# Patient Record
Sex: FEMALE | Race: WHITE | NOT HISPANIC OR LATINO | Employment: FULL TIME | URBAN - METROPOLITAN AREA
[De-identification: names, ages, dates, MRNs, and addresses within clinical notes are randomized per-mention and may not be internally consistent; named-entity substitution may affect disease eponyms.]

---

## 2018-12-11 ENCOUNTER — HOSPITAL ENCOUNTER (OUTPATIENT)
Dept: LAB | Facility: MEDICAL CENTER | Age: 63
End: 2018-12-11
Attending: FAMILY MEDICINE
Payer: COMMERCIAL

## 2018-12-11 LAB
25(OH)D3 SERPL-MCNC: 32 NG/ML (ref 30–100)
ALBUMIN SERPL BCP-MCNC: 4.5 G/DL (ref 3.2–4.9)
ALBUMIN/GLOB SERPL: 1.3 G/DL
ALP SERPL-CCNC: 70 U/L (ref 30–99)
ALT SERPL-CCNC: 20 U/L (ref 2–50)
ANION GAP SERPL CALC-SCNC: 7 MMOL/L (ref 0–11.9)
AST SERPL-CCNC: 24 U/L (ref 12–45)
BASOPHILS # BLD AUTO: 1.2 % (ref 0–1.8)
BASOPHILS # BLD: 0.06 K/UL (ref 0–0.12)
BILIRUB SERPL-MCNC: 0.6 MG/DL (ref 0.1–1.5)
BUN SERPL-MCNC: 16 MG/DL (ref 8–22)
CALCIUM SERPL-MCNC: 10.1 MG/DL (ref 8.5–10.5)
CHLORIDE SERPL-SCNC: 102 MMOL/L (ref 96–112)
CHOLEST SERPL-MCNC: 278 MG/DL (ref 100–199)
CO2 SERPL-SCNC: 28 MMOL/L (ref 20–33)
CREAT SERPL-MCNC: 0.97 MG/DL (ref 0.5–1.4)
EOSINOPHIL # BLD AUTO: 0.3 K/UL (ref 0–0.51)
EOSINOPHIL NFR BLD: 6 % (ref 0–6.9)
ERYTHROCYTE [DISTWIDTH] IN BLOOD BY AUTOMATED COUNT: 47 FL (ref 35.9–50)
GLOBULIN SER CALC-MCNC: 3.5 G/DL (ref 1.9–3.5)
GLUCOSE SERPL-MCNC: 103 MG/DL (ref 65–99)
HCT VFR BLD AUTO: 44.7 % (ref 37–47)
HDLC SERPL-MCNC: 94 MG/DL
HGB BLD-MCNC: 15.2 G/DL (ref 12–16)
IMM GRANULOCYTES # BLD AUTO: 0.01 K/UL (ref 0–0.11)
IMM GRANULOCYTES NFR BLD AUTO: 0.2 % (ref 0–0.9)
LDLC SERPL CALC-MCNC: 169 MG/DL
LYMPHOCYTES # BLD AUTO: 1.4 K/UL (ref 1–4.8)
LYMPHOCYTES NFR BLD: 27.8 % (ref 22–41)
MCH RBC QN AUTO: 31.9 PG (ref 27–33)
MCHC RBC AUTO-ENTMCNC: 34 G/DL (ref 33.6–35)
MCV RBC AUTO: 93.7 FL (ref 81.4–97.8)
MONOCYTES # BLD AUTO: 0.54 K/UL (ref 0–0.85)
MONOCYTES NFR BLD AUTO: 10.7 % (ref 0–13.4)
NEUTROPHILS # BLD AUTO: 2.72 K/UL (ref 2–7.15)
NEUTROPHILS NFR BLD: 54.1 % (ref 44–72)
NRBC # BLD AUTO: 0 K/UL
NRBC BLD-RTO: 0 /100 WBC
PLATELET # BLD AUTO: 353 K/UL (ref 164–446)
PMV BLD AUTO: 10.4 FL (ref 9–12.9)
POTASSIUM SERPL-SCNC: 4.3 MMOL/L (ref 3.6–5.5)
PROT SERPL-MCNC: 8 G/DL (ref 6–8.2)
RBC # BLD AUTO: 4.77 M/UL (ref 4.2–5.4)
SODIUM SERPL-SCNC: 137 MMOL/L (ref 135–145)
TRIGL SERPL-MCNC: 73 MG/DL (ref 0–149)
TSH SERPL DL<=0.005 MIU/L-ACNC: 2.88 UIU/ML (ref 0.38–5.33)
WBC # BLD AUTO: 5 K/UL (ref 4.8–10.8)

## 2018-12-11 PROCEDURE — 82306 VITAMIN D 25 HYDROXY: CPT

## 2018-12-11 PROCEDURE — 36415 COLL VENOUS BLD VENIPUNCTURE: CPT

## 2018-12-11 PROCEDURE — 85025 COMPLETE CBC W/AUTO DIFF WBC: CPT

## 2018-12-11 PROCEDURE — 84443 ASSAY THYROID STIM HORMONE: CPT

## 2018-12-11 PROCEDURE — 80053 COMPREHEN METABOLIC PANEL: CPT

## 2018-12-11 PROCEDURE — 80061 LIPID PANEL: CPT

## 2019-10-03 ENCOUNTER — NON-PROVIDER VISIT (OUTPATIENT)
Dept: INTERNAL MEDICINE | Facility: IMAGING CENTER | Age: 64
End: 2019-10-03
Payer: COMMERCIAL

## 2019-10-03 ENCOUNTER — APPOINTMENT (OUTPATIENT)
Dept: INTERNAL MEDICINE | Facility: IMAGING CENTER | Age: 64
End: 2019-10-03
Payer: COMMERCIAL

## 2019-10-03 DIAGNOSIS — Z23 NEED FOR VACCINATION: ICD-10-CM

## 2019-10-03 PROCEDURE — 90686 IIV4 VACC NO PRSV 0.5 ML IM: CPT | Performed by: FAMILY MEDICINE

## 2019-10-03 PROCEDURE — 90471 IMMUNIZATION ADMIN: CPT | Performed by: FAMILY MEDICINE

## 2019-10-24 ENCOUNTER — OFFICE VISIT (OUTPATIENT)
Dept: INTERNAL MEDICINE | Facility: IMAGING CENTER | Age: 64
End: 2019-10-24
Payer: COMMERCIAL

## 2019-10-24 VITALS
HEIGHT: 64 IN | SYSTOLIC BLOOD PRESSURE: 111 MMHG | TEMPERATURE: 98 F | BODY MASS INDEX: 28.85 KG/M2 | WEIGHT: 169 LBS | OXYGEN SATURATION: 100 % | HEART RATE: 78 BPM | DIASTOLIC BLOOD PRESSURE: 71 MMHG | RESPIRATION RATE: 16 BRPM

## 2019-10-24 DIAGNOSIS — M85.9 DISORDER OF BONE DENSITY AND STRUCTURE, UNSPECIFIED: ICD-10-CM

## 2019-10-24 DIAGNOSIS — Z12.31 ENCOUNTER FOR SCREENING MAMMOGRAM FOR BREAST CANCER: ICD-10-CM

## 2019-10-24 DIAGNOSIS — Z00.00 HEALTH CARE MAINTENANCE: ICD-10-CM

## 2019-10-24 DIAGNOSIS — Z71.85 VACCINE COUNSELING: ICD-10-CM

## 2019-10-24 DIAGNOSIS — E28.39 ESTROGEN DEFICIENCY: ICD-10-CM

## 2019-10-24 DIAGNOSIS — F41.1 GAD (GENERALIZED ANXIETY DISORDER): ICD-10-CM

## 2019-10-24 DIAGNOSIS — Z23 NEED FOR VACCINATION: ICD-10-CM

## 2019-10-24 DIAGNOSIS — E78.2 MIXED DYSLIPIDEMIA: ICD-10-CM

## 2019-10-24 DIAGNOSIS — R73.9 HYPERGLYCEMIA: ICD-10-CM

## 2019-10-24 DIAGNOSIS — K21.9 GASTROESOPHAGEAL REFLUX DISEASE WITHOUT ESOPHAGITIS: ICD-10-CM

## 2019-10-24 PROBLEM — Z83.3 FAMILY HISTORY OF DIABETES MELLITUS (DM): Status: ACTIVE | Noted: 2019-10-24

## 2019-10-24 PROCEDURE — 90471 IMMUNIZATION ADMIN: CPT | Performed by: FAMILY MEDICINE

## 2019-10-24 PROCEDURE — 90715 TDAP VACCINE 7 YRS/> IM: CPT | Performed by: FAMILY MEDICINE

## 2019-10-24 PROCEDURE — 99204 OFFICE O/P NEW MOD 45 MIN: CPT | Mod: 25 | Performed by: FAMILY MEDICINE

## 2019-10-24 RX ORDER — FAMOTIDINE 20 MG/1
20 TABLET, FILM COATED ORAL 2 TIMES DAILY
Qty: 180 TAB | Refills: 3 | Status: SHIPPED | OUTPATIENT
Start: 2019-10-24

## 2019-10-24 RX ORDER — PAROXETINE 10 MG/1
10 TABLET, FILM COATED ORAL DAILY
Qty: 90 TAB | Refills: 3 | Status: SHIPPED | OUTPATIENT
Start: 2019-10-24 | End: 2020-10-26

## 2019-10-24 NOTE — PROGRESS NOTES
"Chief Complaint   Patient presents with   • Establish Care   • Immunizations     Tdap       HPI:  Patient is a 64 y.o. female patient who presents today to establish care at my office. She was previously seeing Dr. Giselle Cho for her primary care needs and has seen Dr. Wynne in the past for her gynecological care. She has a history of mixed dyslipidemia without statin use, chronic ISADORA with ongoing daily Paxil use, and chronic GERD marginally controlled with daily Omeprazole use. She endorses that certain foods and wine can cause increased acid symptoms, and she is not currently sleeping on elevated surface. She had a normal screening colonoscopy three years ago with Dr. Bueno and is due for annual mammogram. She is also due for Tdap and is interested in Shingrix vaccines. She has not had a dexa scan and is now post menopausal. She denies other new health concerns, endorses 100% medication compliance, leads an active lifestyle, and is in good spirits today.     Patient Active Problem List    Diagnosis Date Noted   • Gastroesophageal reflux disease without esophagitis 10/24/2019   • ISADORA (generalized anxiety disorder) 10/24/2019   • Mixed dyslipidemia 10/24/2019   • Hyperglycemia 10/24/2019   • Family history of diabetes mellitus (DM) 10/24/2019       Past medical, surgical, family, and social history was reviewed and updated in Epic chart by me today.     Medications and allergies reviewed and updated in Epic chart by me today.     ROS:  Pertinent positives listed above in HPI. All other systems have been reviewed and are negative.    PE:   /71 (BP Location: Left arm, Patient Position: Sitting, BP Cuff Size: Adult)   Pulse 78   Temp 36.7 °C (98 °F) (Temporal)   Resp 16   Ht 1.626 m (5' 4\")   Wt 76.7 kg (169 lb)   LMP 12/19/2012   SpO2 100%   BMI 29.01 kg/m²   Vital signs reviewed with patient.     Gen: Well developed; well nourished; no acute distress; age appropriate appearance   HEENT: " Normocephalic; atraumatic; PEERLA b/l; sclera clear b/l; b/l external auditory canals WNL; b/l TM WNL; nares patent; oropharynx clear; oral mucosa moist; tongue midline; dentition adequate   Neck: No adenopathy; no thyromegaly  CV: Regular rate and rhythm; S1/ S2 present; no murmur, gallop or rub noted  Pulm: No respiratory distress; clear to ascultation b/l; no wheezing or stridor noted b/l  Abd: Adequate bowel sounds noted; soft and nontender; no rebound, rigidity, nor distention  Extremities: No peripheral edema b/l LE extremities/ no clubbing nor cyanosis noted  Skin: Warm and dry; no rashes noted   Neuro: No focal deficits noted   Psych: AAOx4; mood and affect are appropriate    A/P:  1. Need for vaccination  Vaccine administered at visit today.  - Tdap =>8yo IM    2. Gastroesophageal reflux disease without esophagitis  Ongoing issue for patient not controlled with daily Omeprazole use. Recommend patient sleep on elevated surface, avoid trigger foods and wine, especially before bed, and switch to Pepcid BID for acid control. New RX sent to pharmacy and will follow.   - famotidine (PEPCID) 20 MG Tab; Take 1 Tab by mouth 2 times a day.  Dispense: 180 Tab; Refill: 3    3. ISADORA (generalized anxiety disorder)  Stable/ condition well controlled with daily Paxil use. No safety concerns present. New RX sent to pharmacy.   - PARoxetine (PAXIL) 10 MG Tab; Take 1 Tab by mouth every day. Indications: aurobindo brand  Dispense: 90 Tab; Refill: 3    4. Encounter for screening mammogram for breast cancer  Pt is due for annual mammogram and provided patient with number to call to schedule imaging appointment.   - MA-SCREENING MAMMO BILAT W/TOMOSYNTHESIS W/CAD; Future    5. Estrogen deficiency  Pt is now post menopausal and is due for dexa scan. Pt provided with number to call to schedule imaging appointment.   - DS-BONE DENSITY STUDY (DEXA); Future    6. Disorder of bone density and structure, unspecified  - DS-BONE DENSITY  STUDY (DEXA); Future    7. Mixed dyslipidemia  Historically uncontrolled without patient being aware of this condition. She reports making lifestyle and diet changes in the past year and is due for annual fasting lipid panel.   - Lipid Profile; Future    8. Hyperglycemia  Glucose was 103 on prior lab testing and patient is due for HgA1c level.   - HEMOGLOBIN A1C; Future    9. Vaccine counseling  Pt is interested in Shingrix vaccines and will be placed on our clinic waiting list.     10. Health care maintenance  Pt is due for annual fasting labs before the end of the year. Jeanine BEST will obtain colonoscopy report from [facility] for our records, and patient encouraged to update her GYN exam/pap with Dr. Wynne in the near future.   - CBC WITH DIFFERENTIAL; Future  - Comp Metabolic Panel; Future  - TSH WITH REFLEX TO FT4; Future  - VITAMIN D,25 HYDROXY; Future    I will be in touch with patient regarding lab results, when they become available, for further care management.

## 2019-10-31 ENCOUNTER — HOSPITAL ENCOUNTER (OUTPATIENT)
Facility: MEDICAL CENTER | Age: 64
End: 2019-10-31
Attending: FAMILY MEDICINE
Payer: COMMERCIAL

## 2019-10-31 ENCOUNTER — NON-PROVIDER VISIT (OUTPATIENT)
Dept: INTERNAL MEDICINE | Facility: IMAGING CENTER | Age: 64
End: 2019-10-31
Payer: COMMERCIAL

## 2019-10-31 DIAGNOSIS — R73.9 HYPERGLYCEMIA: ICD-10-CM

## 2019-10-31 DIAGNOSIS — E78.2 MIXED DYSLIPIDEMIA: ICD-10-CM

## 2019-10-31 DIAGNOSIS — Z00.00 HEALTH CARE MAINTENANCE: ICD-10-CM

## 2019-10-31 LAB
25(OH)D3 SERPL-MCNC: 35 NG/ML (ref 30–100)
ALBUMIN SERPL BCP-MCNC: 4.5 G/DL (ref 3.2–4.9)
ALBUMIN/GLOB SERPL: 1.4 G/DL
ALP SERPL-CCNC: 79 U/L (ref 30–99)
ALT SERPL-CCNC: 28 U/L (ref 2–50)
ANION GAP SERPL CALC-SCNC: 11 MMOL/L (ref 0–11.9)
AST SERPL-CCNC: 22 U/L (ref 12–45)
BASOPHILS # BLD AUTO: 1.6 % (ref 0–1.8)
BASOPHILS # BLD: 0.08 K/UL (ref 0–0.12)
BILIRUB SERPL-MCNC: 0.5 MG/DL (ref 0.1–1.5)
BUN SERPL-MCNC: 16 MG/DL (ref 8–22)
CALCIUM SERPL-MCNC: 9.5 MG/DL (ref 8.5–10.5)
CHLORIDE SERPL-SCNC: 104 MMOL/L (ref 96–112)
CHOLEST SERPL-MCNC: 247 MG/DL (ref 100–199)
CO2 SERPL-SCNC: 27 MMOL/L (ref 20–33)
CREAT SERPL-MCNC: 1.16 MG/DL (ref 0.5–1.4)
EOSINOPHIL # BLD AUTO: 0.23 K/UL (ref 0–0.51)
EOSINOPHIL NFR BLD: 4.6 % (ref 0–6.9)
ERYTHROCYTE [DISTWIDTH] IN BLOOD BY AUTOMATED COUNT: 52.1 FL (ref 35.9–50)
EST. AVERAGE GLUCOSE BLD GHB EST-MCNC: 108 MG/DL
GLOBULIN SER CALC-MCNC: 3.3 G/DL (ref 1.9–3.5)
GLUCOSE SERPL-MCNC: 95 MG/DL (ref 65–99)
HBA1C MFR BLD: 5.4 % (ref 0–5.6)
HCT VFR BLD AUTO: 48.4 % (ref 37–47)
HDLC SERPL-MCNC: 71 MG/DL
HGB BLD-MCNC: 15.4 G/DL (ref 12–16)
IMM GRANULOCYTES # BLD AUTO: 0 K/UL (ref 0–0.11)
IMM GRANULOCYTES NFR BLD AUTO: 0 % (ref 0–0.9)
LDLC SERPL CALC-MCNC: 158 MG/DL
LYMPHOCYTES # BLD AUTO: 1.58 K/UL (ref 1–4.8)
LYMPHOCYTES NFR BLD: 31.8 % (ref 22–41)
MCH RBC QN AUTO: 31.7 PG (ref 27–33)
MCHC RBC AUTO-ENTMCNC: 31.8 G/DL (ref 33.6–35)
MCV RBC AUTO: 99.6 FL (ref 81.4–97.8)
MONOCYTES # BLD AUTO: 0.56 K/UL (ref 0–0.85)
MONOCYTES NFR BLD AUTO: 11.3 % (ref 0–13.4)
NEUTROPHILS # BLD AUTO: 2.52 K/UL (ref 2–7.15)
NEUTROPHILS NFR BLD: 50.7 % (ref 44–72)
NRBC # BLD AUTO: 0 K/UL
NRBC BLD-RTO: 0 /100 WBC
PLATELET # BLD AUTO: 360 K/UL (ref 164–446)
PMV BLD AUTO: 10.2 FL (ref 9–12.9)
POTASSIUM SERPL-SCNC: 4.4 MMOL/L (ref 3.6–5.5)
PROT SERPL-MCNC: 7.8 G/DL (ref 6–8.2)
RBC # BLD AUTO: 4.86 M/UL (ref 4.2–5.4)
SODIUM SERPL-SCNC: 142 MMOL/L (ref 135–145)
TRIGL SERPL-MCNC: 91 MG/DL (ref 0–149)
TSH SERPL DL<=0.005 MIU/L-ACNC: 1.81 UIU/ML (ref 0.38–5.33)
WBC # BLD AUTO: 5 K/UL (ref 4.8–10.8)

## 2019-10-31 PROCEDURE — 84443 ASSAY THYROID STIM HORMONE: CPT

## 2019-10-31 PROCEDURE — 82306 VITAMIN D 25 HYDROXY: CPT

## 2019-10-31 PROCEDURE — 80053 COMPREHEN METABOLIC PANEL: CPT

## 2019-10-31 PROCEDURE — 85025 COMPLETE CBC W/AUTO DIFF WBC: CPT

## 2019-10-31 PROCEDURE — 83036 HEMOGLOBIN GLYCOSYLATED A1C: CPT

## 2019-10-31 PROCEDURE — 80061 LIPID PANEL: CPT

## 2019-11-22 ENCOUNTER — HOSPITAL ENCOUNTER (OUTPATIENT)
Dept: RADIOLOGY | Facility: MEDICAL CENTER | Age: 64
End: 2019-11-22

## 2019-11-25 ENCOUNTER — APPOINTMENT (OUTPATIENT)
Dept: RADIOLOGY | Facility: MEDICAL CENTER | Age: 64
End: 2019-11-25
Attending: FAMILY MEDICINE
Payer: COMMERCIAL

## 2019-12-27 ENCOUNTER — HOSPITAL ENCOUNTER (OUTPATIENT)
Dept: RADIOLOGY | Facility: MEDICAL CENTER | Age: 64
End: 2019-12-27
Attending: FAMILY MEDICINE
Payer: COMMERCIAL

## 2019-12-27 DIAGNOSIS — E28.39 ESTROGEN DEFICIENCY: ICD-10-CM

## 2019-12-27 DIAGNOSIS — Z12.31 ENCOUNTER FOR SCREENING MAMMOGRAM FOR BREAST CANCER: ICD-10-CM

## 2019-12-27 DIAGNOSIS — M85.9 DISORDER OF BONE DENSITY AND STRUCTURE, UNSPECIFIED: ICD-10-CM

## 2019-12-27 PROCEDURE — 77063 BREAST TOMOSYNTHESIS BI: CPT

## 2019-12-27 PROCEDURE — 77080 DXA BONE DENSITY AXIAL: CPT

## 2020-05-20 DIAGNOSIS — J01.80 ACUTE NON-RECURRENT SINUSITIS OF OTHER SINUS: ICD-10-CM

## 2020-05-20 RX ORDER — AMOXICILLIN AND CLAVULANATE POTASSIUM 875; 125 MG/1; MG/1
1 TABLET, FILM COATED ORAL 2 TIMES DAILY
Qty: 14 TAB | Refills: 0 | Status: SHIPPED | OUTPATIENT
Start: 2020-05-20 | End: 2020-05-27

## 2020-06-25 ENCOUNTER — OFFICE VISIT (OUTPATIENT)
Dept: INTERNAL MEDICINE | Facility: IMAGING CENTER | Age: 65
End: 2020-06-25
Payer: COMMERCIAL

## 2020-06-25 VITALS
TEMPERATURE: 98.3 F | SYSTOLIC BLOOD PRESSURE: 127 MMHG | HEART RATE: 72 BPM | HEIGHT: 64 IN | OXYGEN SATURATION: 94 % | RESPIRATION RATE: 17 BRPM | BODY MASS INDEX: 28.87 KG/M2 | WEIGHT: 169.09 LBS | DIASTOLIC BLOOD PRESSURE: 78 MMHG

## 2020-06-25 DIAGNOSIS — E78.2 MIXED DYSLIPIDEMIA: ICD-10-CM

## 2020-06-25 DIAGNOSIS — J30.2 SEASONAL ALLERGIES: ICD-10-CM

## 2020-06-25 PROCEDURE — 96372 THER/PROPH/DIAG INJ SC/IM: CPT | Performed by: FAMILY MEDICINE

## 2020-06-25 PROCEDURE — 99214 OFFICE O/P EST MOD 30 MIN: CPT | Mod: 25 | Performed by: FAMILY MEDICINE

## 2020-06-25 RX ORDER — AZELASTINE 1 MG/ML
1 SPRAY, METERED NASAL 2 TIMES DAILY
Qty: 30 ML | Refills: 6 | Status: SHIPPED | OUTPATIENT
Start: 2020-06-25 | End: 2021-11-16 | Stop reason: SDUPTHER

## 2020-06-25 RX ORDER — TRIAMCINOLONE ACETONIDE 40 MG/ML
40 INJECTION, SUSPENSION INTRA-ARTICULAR; INTRAMUSCULAR ONCE
Status: COMPLETED | OUTPATIENT
Start: 2020-06-25 | End: 2020-06-25

## 2020-06-25 RX ADMIN — TRIAMCINOLONE ACETONIDE 40 MG: 40 INJECTION, SUSPENSION INTRA-ARTICULAR; INTRAMUSCULAR at 13:15

## 2020-06-25 ASSESSMENT — PATIENT HEALTH QUESTIONNAIRE - PHQ9: CLINICAL INTERPRETATION OF PHQ2 SCORE: 0

## 2020-06-25 ASSESSMENT — FIBROSIS 4 INDEX: FIB4 SCORE: 0.74

## 2020-06-25 NOTE — PROGRESS NOTES
"Chief Complaint   Patient presents with   • Seasonal Allergies     Has been getting worse and worse. Sneezing, increased nasal drainage, and itching. Takes allegra every night and flonase.        HPI:  Patient is a 65 y.o. female established patient who presents today to discuss worsening seasonal allergy symptoms over the past few months. She reports continual nasal drip (clear secretions), sneezing, and general throat itching, without associated fever, sinus congestion, post nasal drip, nor cough. She has been taking Allegra and Flonase daily without relief and is seeking new options. She also has history of chronic mixed dyslipidemia without statin use and is due for repeat fasting lipid panel. She is in good spirits today and is aware of GYN exam being due.     Patient Active Problem List    Diagnosis Date Noted   • Seasonal allergies 06/25/2020   • Gastroesophageal reflux disease without esophagitis 10/24/2019   • ISADORA (generalized anxiety disorder) 10/24/2019   • Mixed dyslipidemia 10/24/2019   • Hyperglycemia 10/24/2019   • Family history of diabetes mellitus (DM) 10/24/2019       Past medical, surgical, family, and social history was reviewed and updated in Epic chart by me today.     Medications and allergies reviewed and updated in Epic chart by me today.     ROS:  Pertinent positives listed above in HPI. All other systems have been reviewed and are negative.    PE:   /78 (BP Location: Left arm, Patient Position: Sitting, BP Cuff Size: Adult)   Pulse 72   Temp 36.8 °C (98.3 °F) (Temporal)   Resp 17   Ht 1.626 m (5' 4\")   Wt 76.7 kg (169 lb 1.5 oz)   LMP 12/19/2012   SpO2 94%   BMI 29.02 kg/m²   Vital signs reviewed with patient.     Gen: Well developed; well nourished; no acute distress; non toxic appearance   HEENT: Normocephalic; atraumatic; PEERLA b/l; sclera clear b/l; b/l external auditory canals WNL; b/l TM WNL; nares patent but mucosa is erythematous b/l; oropharynx clear; oral mucosa " moist; tongue midline; dentition adequate   Neck: No adenopathy; no thyromegaly  CV: Regular rate and rhythm; S1/ S2 present; no murmur, gallop or rub noted  Pulm: No respiratory distress; clear to ascultation b/l; no wheezing or stridor noted b/l  Abd: Adequate bowel sounds noted; soft and nontender; no rebound, rigidity, nor distention  Extremities: No peripheral edema b/l LE extremities/ no clubbing nor cyanosis noted  Skin: Warm and dry; no rashes noted   Neuro: No focal deficits noted   Psych: AAOx4; mood and affect are appropriate    A/P:  1. Seasonal allergies  Uncontrolled with current daily Allegra and Flonase use. Recommend patient switch from Allegra to Xyzal use daily, switch from Flonase to Astelin use daily, and obtain Kenalog injection today. Risks versus benefits of Kenalog use discussed with patient prior to injection today, and patient will contact me in the next few weeks if condition does not improve with these changes. Will refer patient to Allergists if this occurs. New RX sent to pharmacy.   - triamcinolone acetonide (KENALOG-40) injection 40 mg  - azelastine (ASTELIN) 137 MCG/SPRAY nasal spray; Spray 1 Spray in nose 2 times a day.  Dispense: 30 mL; Refill: 6    2. Mixed dyslipidemia  Uncontrolled historically without statin use. Patient is due for repeat lipid panel and will obtain this lab draw soon.   - Lipid Profile; Future

## 2020-10-25 DIAGNOSIS — F41.1 GAD (GENERALIZED ANXIETY DISORDER): ICD-10-CM

## 2020-10-26 RX ORDER — PAROXETINE 10 MG/1
TABLET, FILM COATED ORAL
Qty: 90 TAB | Refills: 3 | Status: SHIPPED | OUTPATIENT
Start: 2020-10-26 | End: 2021-07-08

## 2020-12-01 ENCOUNTER — NON-PROVIDER VISIT (OUTPATIENT)
Dept: INTERNAL MEDICINE | Facility: IMAGING CENTER | Age: 65
End: 2020-12-01
Payer: COMMERCIAL

## 2020-12-01 DIAGNOSIS — Z23 NEED FOR VACCINATION: ICD-10-CM

## 2020-12-01 PROCEDURE — 90686 IIV4 VACC NO PRSV 0.5 ML IM: CPT | Performed by: FAMILY MEDICINE

## 2020-12-01 PROCEDURE — 90471 IMMUNIZATION ADMIN: CPT | Performed by: FAMILY MEDICINE

## 2020-12-29 ENCOUNTER — APPOINTMENT (OUTPATIENT)
Dept: INTERNAL MEDICINE | Facility: IMAGING CENTER | Age: 65
End: 2020-12-29
Payer: COMMERCIAL

## 2020-12-31 ENCOUNTER — NON-PROVIDER VISIT (OUTPATIENT)
Dept: INTERNAL MEDICINE | Facility: IMAGING CENTER | Age: 65
End: 2020-12-31
Payer: COMMERCIAL

## 2020-12-31 DIAGNOSIS — Z23 NEED FOR VACCINATION: ICD-10-CM

## 2020-12-31 PROCEDURE — 90471 IMMUNIZATION ADMIN: CPT | Performed by: FAMILY MEDICINE

## 2020-12-31 PROCEDURE — 90750 HZV VACC RECOMBINANT IM: CPT | Performed by: FAMILY MEDICINE

## 2021-03-03 DIAGNOSIS — Z23 NEED FOR VACCINATION: ICD-10-CM

## 2021-04-20 DIAGNOSIS — E55.9 VITAMIN D DEFICIENCY: ICD-10-CM

## 2021-04-20 DIAGNOSIS — Z00.00 HEALTH CARE MAINTENANCE: ICD-10-CM

## 2021-04-20 DIAGNOSIS — R73.9 HYPERGLYCEMIA: ICD-10-CM

## 2021-04-20 DIAGNOSIS — R53.83 OTHER FATIGUE: ICD-10-CM

## 2021-04-21 ENCOUNTER — NON-PROVIDER VISIT (OUTPATIENT)
Dept: INTERNAL MEDICINE | Facility: IMAGING CENTER | Age: 66
End: 2021-04-21
Payer: COMMERCIAL

## 2021-04-21 ENCOUNTER — HOSPITAL ENCOUNTER (OUTPATIENT)
Facility: MEDICAL CENTER | Age: 66
End: 2021-04-21
Attending: FAMILY MEDICINE
Payer: COMMERCIAL

## 2021-04-21 DIAGNOSIS — E78.2 MIXED DYSLIPIDEMIA: ICD-10-CM

## 2021-04-21 DIAGNOSIS — R53.83 OTHER FATIGUE: ICD-10-CM

## 2021-04-21 DIAGNOSIS — R73.9 HYPERGLYCEMIA: ICD-10-CM

## 2021-04-21 DIAGNOSIS — E55.9 VITAMIN D DEFICIENCY: ICD-10-CM

## 2021-04-21 DIAGNOSIS — Z00.00 HEALTH CARE MAINTENANCE: ICD-10-CM

## 2021-04-21 LAB
ALBUMIN SERPL BCP-MCNC: 4.6 G/DL (ref 3.2–4.9)
ALBUMIN/GLOB SERPL: 1.4 G/DL
ALP SERPL-CCNC: 78 U/L (ref 30–99)
ALT SERPL-CCNC: 22 U/L (ref 2–50)
ANION GAP SERPL CALC-SCNC: 9 MMOL/L (ref 7–16)
AST SERPL-CCNC: 35 U/L (ref 12–45)
BASOPHILS # BLD AUTO: 2 % (ref 0–1.8)
BASOPHILS # BLD: 0.08 K/UL (ref 0–0.12)
BILIRUB SERPL-MCNC: 0.6 MG/DL (ref 0.1–1.5)
BUN SERPL-MCNC: 12 MG/DL (ref 8–22)
CALCIUM SERPL-MCNC: 9.5 MG/DL (ref 8.5–10.5)
CHLORIDE SERPL-SCNC: 102 MMOL/L (ref 96–112)
CHOLEST SERPL-MCNC: 310 MG/DL (ref 100–199)
CO2 SERPL-SCNC: 26 MMOL/L (ref 20–33)
CREAT SERPL-MCNC: 1.03 MG/DL (ref 0.5–1.4)
EOSINOPHIL # BLD AUTO: 0.25 K/UL (ref 0–0.51)
EOSINOPHIL NFR BLD: 6.1 % (ref 0–6.9)
ERYTHROCYTE [DISTWIDTH] IN BLOOD BY AUTOMATED COUNT: 50.7 FL (ref 35.9–50)
EST. AVERAGE GLUCOSE BLD GHB EST-MCNC: 105 MG/DL
GLOBULIN SER CALC-MCNC: 3.2 G/DL (ref 1.9–3.5)
GLUCOSE SERPL-MCNC: 92 MG/DL (ref 65–99)
HBA1C MFR BLD: 5.3 % (ref 4–5.6)
HCT VFR BLD AUTO: 45.7 % (ref 37–47)
HDLC SERPL-MCNC: 81 MG/DL
HGB BLD-MCNC: 15.1 G/DL (ref 12–16)
IMM GRANULOCYTES # BLD AUTO: 0 K/UL (ref 0–0.11)
IMM GRANULOCYTES NFR BLD AUTO: 0 % (ref 0–0.9)
LDLC SERPL CALC-MCNC: 207 MG/DL
LYMPHOCYTES # BLD AUTO: 1.44 K/UL (ref 1–4.8)
LYMPHOCYTES NFR BLD: 35.4 % (ref 22–41)
MCH RBC QN AUTO: 32.2 PG (ref 27–33)
MCHC RBC AUTO-ENTMCNC: 33 G/DL (ref 33.6–35)
MCV RBC AUTO: 97.4 FL (ref 81.4–97.8)
MONOCYTES # BLD AUTO: 0.42 K/UL (ref 0–0.85)
MONOCYTES NFR BLD AUTO: 10.3 % (ref 0–13.4)
NEUTROPHILS # BLD AUTO: 1.88 K/UL (ref 2–7.15)
NEUTROPHILS NFR BLD: 46.2 % (ref 44–72)
NRBC # BLD AUTO: 0 K/UL
NRBC BLD-RTO: 0 /100 WBC
PLATELET # BLD AUTO: 321 K/UL (ref 164–446)
PMV BLD AUTO: 10.2 FL (ref 9–12.9)
POTASSIUM SERPL-SCNC: 4 MMOL/L (ref 3.6–5.5)
PROT SERPL-MCNC: 7.8 G/DL (ref 6–8.2)
RBC # BLD AUTO: 4.69 M/UL (ref 4.2–5.4)
SODIUM SERPL-SCNC: 137 MMOL/L (ref 135–145)
TRIGL SERPL-MCNC: 112 MG/DL (ref 0–149)
TSH SERPL DL<=0.005 MIU/L-ACNC: 3.54 UIU/ML (ref 0.38–5.33)
WBC # BLD AUTO: 4.1 K/UL (ref 4.8–10.8)

## 2021-04-21 PROCEDURE — 82306 VITAMIN D 25 HYDROXY: CPT

## 2021-04-21 PROCEDURE — 83036 HEMOGLOBIN GLYCOSYLATED A1C: CPT

## 2021-04-21 PROCEDURE — 80053 COMPREHEN METABOLIC PANEL: CPT

## 2021-04-21 PROCEDURE — 80061 LIPID PANEL: CPT

## 2021-04-21 PROCEDURE — 84443 ASSAY THYROID STIM HORMONE: CPT

## 2021-04-21 PROCEDURE — 85025 COMPLETE CBC W/AUTO DIFF WBC: CPT

## 2021-04-23 LAB — 25(OH)D3 SERPL-MCNC: 31 NG/ML (ref 30–80)

## 2021-04-29 ENCOUNTER — OFFICE VISIT (OUTPATIENT)
Dept: INTERNAL MEDICINE | Facility: IMAGING CENTER | Age: 66
End: 2021-04-29
Payer: COMMERCIAL

## 2021-04-29 VITALS
HEIGHT: 64 IN | DIASTOLIC BLOOD PRESSURE: 86 MMHG | RESPIRATION RATE: 16 BRPM | WEIGHT: 162 LBS | OXYGEN SATURATION: 93 % | HEART RATE: 80 BPM | SYSTOLIC BLOOD PRESSURE: 124 MMHG | BODY MASS INDEX: 27.66 KG/M2 | TEMPERATURE: 97.8 F

## 2021-04-29 DIAGNOSIS — K21.9 GASTROESOPHAGEAL REFLUX DISEASE WITHOUT ESOPHAGITIS: ICD-10-CM

## 2021-04-29 DIAGNOSIS — R53.83 OTHER FATIGUE: ICD-10-CM

## 2021-04-29 DIAGNOSIS — J30.2 SEASONAL ALLERGIES: ICD-10-CM

## 2021-04-29 DIAGNOSIS — Z12.31 BREAST CANCER SCREENING BY MAMMOGRAM: ICD-10-CM

## 2021-04-29 DIAGNOSIS — Z00.00 WELLNESS EXAMINATION: ICD-10-CM

## 2021-04-29 DIAGNOSIS — Z23 NEED FOR VACCINATION: ICD-10-CM

## 2021-04-29 DIAGNOSIS — E78.2 MIXED DYSLIPIDEMIA: ICD-10-CM

## 2021-04-29 DIAGNOSIS — F32.1 CURRENT MODERATE EPISODE OF MAJOR DEPRESSIVE DISORDER WITHOUT PRIOR EPISODE (HCC): ICD-10-CM

## 2021-04-29 PROBLEM — R73.9 HYPERGLYCEMIA: Status: RESOLVED | Noted: 2019-10-24 | Resolved: 2021-04-29

## 2021-04-29 PROCEDURE — 90732 PPSV23 VACC 2 YRS+ SUBQ/IM: CPT | Performed by: FAMILY MEDICINE

## 2021-04-29 PROCEDURE — 90471 IMMUNIZATION ADMIN: CPT | Performed by: FAMILY MEDICINE

## 2021-04-29 PROCEDURE — 99397 PER PM REEVAL EST PAT 65+ YR: CPT | Mod: 25 | Performed by: FAMILY MEDICINE

## 2021-04-29 RX ORDER — ATORVASTATIN CALCIUM 20 MG/1
20 TABLET, FILM COATED ORAL
Qty: 90 TABLET | Refills: 1 | Status: SHIPPED | OUTPATIENT
Start: 2021-04-29 | End: 2021-11-06 | Stop reason: SDUPTHER

## 2021-04-29 RX ORDER — LEVOCETIRIZINE DIHYDROCHLORIDE 5 MG/1
1 TABLET, FILM COATED ORAL
COMMUNITY

## 2021-04-29 ASSESSMENT — PATIENT HEALTH QUESTIONNAIRE - PHQ9
SUM OF ALL RESPONSES TO PHQ QUESTIONS 1-9: 9
5. POOR APPETITE OR OVEREATING: 2 - MORE THAN HALF THE DAYS
CLINICAL INTERPRETATION OF PHQ2 SCORE: 1

## 2021-04-29 ASSESSMENT — FIBROSIS 4 INDEX: FIB4 SCORE: 1.51

## 2021-05-02 NOTE — PROGRESS NOTES
"Chief Complaint   Patient presents with   • Annual Exam       HPI:  Patient is a 66 y.o. female established patient who presents today for her annual exam and to review labs done 4/21/2021. She has history of chronic mixed dyslipidemia without current statin use, and chronic seasonal allergies managed with oral medication and nasal spray. She has chronic GERD with ongoing PPI use and history of ISADORA with ongoing low dose Paxil use. She endorses feeling significant daytime fatigue and need to sleep 10-12hrs nightly, as well as take afternoon nap most days to preserve her energy. She reports that this has been ongoing for the past several months and is grieving the death of her brother in March/ daughter going through a divorce currently. She denies history of snoring nor disrupted sleep patterns, and her  has now noticed these behaviors/brought it to her attention. She is due for Pneumovax today, has order from GYN team for screening mammogram, and endorses 100% medication compliance.     Patient Active Problem List    Diagnosis Date Noted   • Current moderate episode of major depressive disorder (HCC) 04/29/2021   • Seasonal allergies 06/25/2020   • Gastroesophageal reflux disease without esophagitis 10/24/2019   • ISADORA (generalized anxiety disorder) 10/24/2019   • Mixed dyslipidemia 10/24/2019   • Family history of diabetes mellitus (DM) 10/24/2019       Past medical, surgical, family, and social history was reviewed and updated in Epic chart by me today.     Medications and allergies reviewed and updated in Epic chart by me today.     Lab results 4/21/2021 reviewed with patient at visit today.     ROS:  Pertinent positives listed above in HPI. All other systems have been reviewed and are negative.    PE:   /86 (BP Location: Left arm, Patient Position: Sitting, BP Cuff Size: Adult)   Pulse 80   Temp 36.6 °C (97.8 °F) (Temporal)   Resp 16   Ht 1.626 m (5' 4\")   Wt 73.5 kg (162 lb)   LMP 12/19/2012  "  SpO2 93%   BMI 27.81 kg/m²   Vital signs reviewed with patient.     Gen: Well developed; well nourished; no acute distress; age appropriate appearance   HEENT: Normocephalic; atraumatic; PEERLA b/l; sclera clear b/l; b/l external auditory canals WNL; b/l TM WNL; nares patent; oropharynx clear; mask in place  Neck: No adenopathy; no thyromegaly  CV: Regular rate and rhythm; S1/ S2 present; no murmur, gallop or rub noted  Pulm: No respiratory distress; clear to ascultation b/l; no wheezing or stridor noted b/l  Abd: Adequate bowel sounds noted; soft and nontender; no rebound, rigidity, nor distention  Extremities: No peripheral edema b/l LE extremities/ no clubbing nor cyanosis noted  Skin: Warm and dry; no rashes noted   Neuro: No focal deficits noted   Psych: AAOx4; mood and affect are appropriate    A/P:  1. Need for vaccination  Vaccine administered at visit today.  - PneumoVax PPV23 =>1yo    2. Mixed dyslipidemia  Uncontrolled without current statin use. We discussed condition at visit today, and I recommend patient start nightly Lipitor, continue weight management and heathy food choices, and repeat fasting labs in 3-4 months for ongoing medical management. New RX sent to pharmacy.   - atorvastatin (LIPITOR) 20 MG Tab; Take 1 tablet by mouth at bedtime.  Dispense: 90 tablet; Refill: 1  - Lipid Profile; Future  - CREATINE KINASE; Future    3. Gastroesophageal reflux disease without esophagitis  Stable/ patient can continue current Pepcid use.     4. Seasonal allergies  Stable/ patient can continue current Xyzal and Astelin use PRN.     5. Current moderate episode of major depressive disorder without prior episode (HCC)  Uncontrolled - I feel that patient is suffering from uncontrolled depression (PHQ score is 9 today), which is causing her to sleep 10-12 hrs nightly as well as require a daily nap. She denies history of snoring nor LACHELLE and is grieving from death of brother in March/ daughter going through  divorce. She also endorses periods of diminished appetite and is currently taking Paxil 10 mg daily. Recommend patient increase Paxil dose to 20 mg, consider counseling, and follow clinical response. No overt safety concerns present.     6. Other fatigue  Refer to #5    7. Breast cancer screening by mammogram  Patient is aware of overdue screening mammogram and has order at home from GYN team.     8. Wellness examination  Patient is stable and remains well informed about chronic medical conditions that need additional attention moving forward. Vitamin D level is 31 and recommend patient increase Vitamin D intake daily.     Recommend patient f/u with me in one month for ongoing medical management.

## 2021-07-08 ENCOUNTER — OFFICE VISIT (OUTPATIENT)
Dept: INTERNAL MEDICINE | Facility: IMAGING CENTER | Age: 66
End: 2021-07-08
Payer: MEDICARE

## 2021-07-08 VITALS
HEART RATE: 78 BPM | BODY MASS INDEX: 27.66 KG/M2 | WEIGHT: 162.04 LBS | TEMPERATURE: 98.6 F | OXYGEN SATURATION: 96 % | SYSTOLIC BLOOD PRESSURE: 122 MMHG | DIASTOLIC BLOOD PRESSURE: 70 MMHG | RESPIRATION RATE: 17 BRPM | HEIGHT: 64 IN

## 2021-07-08 DIAGNOSIS — F32.1 CURRENT MODERATE EPISODE OF MAJOR DEPRESSIVE DISORDER WITHOUT PRIOR EPISODE (HCC): ICD-10-CM

## 2021-07-08 DIAGNOSIS — Z12.31 ENCOUNTER FOR SCREENING MAMMOGRAM FOR BREAST CANCER: ICD-10-CM

## 2021-07-08 DIAGNOSIS — J30.2 SEASONAL ALLERGIES: ICD-10-CM

## 2021-07-08 PROCEDURE — 99214 OFFICE O/P EST MOD 30 MIN: CPT | Performed by: FAMILY MEDICINE

## 2021-07-08 RX ORDER — PAROXETINE HYDROCHLORIDE 20 MG/1
20 TABLET, FILM COATED ORAL DAILY
Qty: 90 TABLET | Refills: 3 | Status: SHIPPED | OUTPATIENT
Start: 2021-07-08 | End: 2022-01-02

## 2021-07-08 ASSESSMENT — FIBROSIS 4 INDEX: FIB4 SCORE: 1.51

## 2021-07-09 NOTE — PROGRESS NOTES
"Chief Complaint   Patient presents with   • Follow-Up     Increased dosage of Paxil. Feels much better.    • Seasonal Allergies     Sore throat in morning - thinking tamanna wonders if she should take mucinex for post nasal drip.       HPI:  Patient is a 66 y.o. female established patient who presents today for a follow up appointment regarding chronic major depressive disorder. Since our visit together 4/29/2021, she has increased Paxil dose from 10 mg to 20 mg daily and is feeling much better overall. She feels back at her baseline, and no safety concerns present. She also has chronic seasonal allergies and has noticed morning sore throat upon wakening recently. Condition resolves as morning progresses, and she is wondering if post nasal drip is causing this condition. She denies associated acute illness symptoms at this time and is compliant with daily nasal spray and Xyzal use.     Patient Active Problem List    Diagnosis Date Noted   • Current moderate episode of major depressive disorder (HCC) 04/29/2021   • Seasonal allergies 06/25/2020   • Gastroesophageal reflux disease without esophagitis 10/24/2019   • ISADORA (generalized anxiety disorder) 10/24/2019   • Mixed dyslipidemia 10/24/2019   • Family history of diabetes mellitus (DM) 10/24/2019       Past medical, surgical, family, and social history was reviewed and updated in Epic chart by me today.     Medications and allergies reviewed and updated in Epic chart by me today.     ROS:  Pertinent positives listed above in HPI. All other systems have been reviewed and are negative.    PE:   /70 (BP Location: Left arm, Patient Position: Sitting, BP Cuff Size: Adult)   Pulse 78   Temp 37 °C (98.6 °F) (Temporal)   Resp 17   Ht 1.626 m (5' 4\")   Wt 73.5 kg (162 lb 0.6 oz)   LMP 12/19/2012   SpO2 96%   BMI 27.81 kg/m²   Vital signs reviewed with patient.     Gen: Well developed; well nourished; no acute distress; age appropriate appearance   Skin: Warm " and dry; no rashes noted   Neuro: No focal deficits noted   Psych: AAOx4; mood and affect are appropriate    A/P:  1. Current moderate episode of major depressive disorder without prior episode (HCC)  Markedly improved after Paxil dose increased from 10 to 20 mg daily. Recommend patient continue current Paxil use, and no safety concerns present. New RX sent to pharmacy.   - PARoxetine (PAXIL) 20 MG Tab; Take 1 tablet by mouth every day.  Dispense: 90 tablet; Refill: 3    2. Seasonal allergies  Patient is experiencing morning sore throat upon wakening that resolves as morning progresses. Recommend patient continue current seasonal allergy medication and nasal spray use, add nasal saline rinse PRN, and trial plain Mucinex HS to see if condition resolves.     3. Encounter for screening mammogram for breast cancer  Patient is overdue for screening mammogram, and I encouraged her to update this important screening exam in near future.   - MA-SCREENING MAMMO BILAT W/TOMOSYNTHESIS W/CAD; Future

## 2021-10-15 ENCOUNTER — PATIENT MESSAGE (OUTPATIENT)
Dept: HEALTH INFORMATION MANAGEMENT | Facility: OTHER | Age: 66
End: 2021-10-15

## 2021-10-28 ENCOUNTER — TELEPHONE (OUTPATIENT)
Dept: HEALTH INFORMATION MANAGEMENT | Facility: OTHER | Age: 66
End: 2021-10-28

## 2021-11-02 ENCOUNTER — HOSPITAL ENCOUNTER (OUTPATIENT)
Facility: MEDICAL CENTER | Age: 66
End: 2021-11-02
Attending: FAMILY MEDICINE
Payer: MEDICARE

## 2021-11-02 ENCOUNTER — NON-PROVIDER VISIT (OUTPATIENT)
Dept: INTERNAL MEDICINE | Facility: IMAGING CENTER | Age: 66
End: 2021-11-02
Payer: MEDICARE

## 2021-11-02 DIAGNOSIS — E78.2 MIXED DYSLIPIDEMIA: ICD-10-CM

## 2021-11-02 PROCEDURE — 80061 LIPID PANEL: CPT

## 2021-11-02 PROCEDURE — 82550 ASSAY OF CK (CPK): CPT

## 2021-11-03 PROBLEM — M85.80 OSTEOPENIA: Status: ACTIVE | Noted: 2021-11-03

## 2021-11-03 PROBLEM — N18.31 CHRONIC KIDNEY DISEASE, STAGE 3A: Status: ACTIVE | Noted: 2021-11-03

## 2021-11-03 LAB
CHOLEST SERPL-MCNC: 246 MG/DL (ref 100–199)
CK SERPL-CCNC: 85 U/L (ref 0–154)
HDLC SERPL-MCNC: 93 MG/DL
LDLC SERPL CALC-MCNC: 120 MG/DL
TRIGL SERPL-MCNC: 166 MG/DL (ref 0–149)

## 2021-11-06 DIAGNOSIS — E78.2 MIXED DYSLIPIDEMIA: ICD-10-CM

## 2021-11-06 RX ORDER — ATORVASTATIN CALCIUM 20 MG/1
20 TABLET, FILM COATED ORAL
Qty: 90 TABLET | Refills: 1 | Status: SHIPPED | OUTPATIENT
Start: 2021-11-06 | End: 2022-02-11

## 2021-11-16 ENCOUNTER — OFFICE VISIT (OUTPATIENT)
Dept: INTERNAL MEDICINE | Facility: IMAGING CENTER | Age: 66
End: 2021-11-16
Payer: MEDICARE

## 2021-11-16 VITALS
HEIGHT: 64 IN | SYSTOLIC BLOOD PRESSURE: 122 MMHG | HEART RATE: 95 BPM | DIASTOLIC BLOOD PRESSURE: 66 MMHG | TEMPERATURE: 98.1 F | BODY MASS INDEX: 29.32 KG/M2 | WEIGHT: 171.74 LBS | OXYGEN SATURATION: 93 % | RESPIRATION RATE: 17 BRPM

## 2021-11-16 DIAGNOSIS — J30.2 SEASONAL ALLERGIES: ICD-10-CM

## 2021-11-16 DIAGNOSIS — Z12.31 ENCOUNTER FOR SCREENING MAMMOGRAM FOR MALIGNANT NEOPLASM OF BREAST: ICD-10-CM

## 2021-11-16 DIAGNOSIS — Z23 NEED FOR VACCINATION: ICD-10-CM

## 2021-11-16 DIAGNOSIS — E78.2 MIXED DYSLIPIDEMIA: ICD-10-CM

## 2021-11-16 PROCEDURE — 99214 OFFICE O/P EST MOD 30 MIN: CPT | Mod: 25 | Performed by: FAMILY MEDICINE

## 2021-11-16 PROCEDURE — 90662 IIV NO PRSV INCREASED AG IM: CPT | Performed by: FAMILY MEDICINE

## 2021-11-16 PROCEDURE — G0008 ADMIN INFLUENZA VIRUS VAC: HCPCS | Performed by: FAMILY MEDICINE

## 2021-11-16 RX ORDER — AZELASTINE 1 MG/ML
1 SPRAY, METERED NASAL 2 TIMES DAILY
Qty: 30 ML | Refills: 6 | Status: SHIPPED | OUTPATIENT
Start: 2021-11-16 | End: 2022-07-01 | Stop reason: SDUPTHER

## 2021-11-16 ASSESSMENT — FIBROSIS 4 INDEX: FIB4 SCORE: 1.51

## 2021-11-17 NOTE — PROGRESS NOTES
"Chief Complaint   Patient presents with   • Sinus Problem     Nose is constantly running - has tried mucinex, xyzol, shelly pot. She started Claritin last Saturday and it seems to be helping more than the others.    • Immunizations     Flu shot.       HPI:  Patient is a 66 y.o. female established patient who presents today to obtain annual flu shot and to discuss ongoing nasal drip/seasonal allergy issues. She reports that her typical seasonal allergies have been prolonged and are now unmanageable. She has been using Xyzal now Claritin since last weekend, neti pot rinses, Mucinxes, and daily Astelin without symptom control. She denies associated N/V/D/F/sputum production, and she is seeking other treatment options. She has annual mammogram scheduled for 12/22/2021 and is otherwise stable at this time.     Patient Active Problem List    Diagnosis Date Noted   • Chronic kidney disease, stage 3a (HCC) 11/03/2021   • Osteopenia 11/03/2021   • BMI 30.0-30.9,adult 11/03/2021   • Current moderate episode of major depressive disorder (HCC) 04/29/2021   • Seasonal allergies 06/25/2020   • Gastroesophageal reflux disease without esophagitis 10/24/2019   • ISADORA (generalized anxiety disorder) 10/24/2019   • Mixed dyslipidemia 10/24/2019   • Family history of diabetes mellitus (DM) 10/24/2019       Past medical, surgical, family, and social history was reviewed and updated in Epic chart by me today.     Medications and allergies reviewed and updated in Epic chart by me today.     ROS:  Pertinent positives listed above in HPI. All other systems have been reviewed and are negative.    PE:   /66 (BP Location: Left arm, Patient Position: Sitting, BP Cuff Size: Adult)   Pulse 95   Temp 36.7 °C (98.1 °F) (Temporal)   Resp 17   Ht 1.626 m (5' 4\")   Wt 77.9 kg (171 lb 11.8 oz)   LMP 12/19/2012   SpO2 93%   BMI 29.48 kg/m²   Vital signs reviewed with patient.     Gen: Well developed; well nourished; no acute distress; non " toxic appearance   HEENT: Normocephalic; atraumatic; PEERLA b/l; sclera clear b/l; b/l external auditory canals WNL; b/l TM WNL; nares patent but inflammation present; oropharynx clear; oral mucosa moist; tongue midline; dentition adequate after mask removed  Neck: No adenopathy; no thyromegaly  CV: Regular rate and rhythm; S1/ S2 present; no murmur, gallop or rub noted  Pulm: No respiratory distress; clear to ascultation b/l; no wheezing or stridor noted b/l  Extremities: No peripheral edema b/l LE extremities/ no clubbing nor cyanosis noted  Skin: Warm and dry; no rashes noted   Neuro: No focal deficits noted   Psych: AAOx4; mood and affect are appropriate    A/P:  1. Need for vaccination  Vaccine administered at visit today.  - INFLUENZA VACCINE, HIGH DOSE (65+ ONLY)    2. Seasonal allergies  Uncontrolled condition for patient despite ongoing use of Mucinex, Xyzal and now Claritin, neti pot, and daily Astelin use. We discussed condition at visit and recommend patient see allergist for further specialty evaluation and treatment. Will send referral to [facility] and new RX sent to pharmacy - recommend patient use nasal spray BID moving forward.   - azelastine (ASTELIN) 137 MCG/SPRAY nasal spray; Administer 1 Spray into affected nostril(S) 2 times a day.  Dispense: 30 mL; Refill: 6  - Referral to Allergy    3. Mixed dyslipidemia  Improved since patient started Lipitor 20 mg HS. Recommend patient continue current statin use, ongoing healthy diet choices, and will follow closely.     4. Encounter for screening mammogram for malignant neoplasm of breast  Patient has screening mammogram scheduled for 12/22/2021.

## 2021-12-22 ENCOUNTER — HOSPITAL ENCOUNTER (OUTPATIENT)
Dept: RADIOLOGY | Facility: MEDICAL CENTER | Age: 66
End: 2021-12-22
Attending: FAMILY MEDICINE
Payer: MEDICARE

## 2021-12-22 DIAGNOSIS — Z12.31 ENCOUNTER FOR SCREENING MAMMOGRAM FOR BREAST CANCER: ICD-10-CM

## 2021-12-22 PROCEDURE — 77063 BREAST TOMOSYNTHESIS BI: CPT

## 2022-01-02 DIAGNOSIS — F32.1 CURRENT MODERATE EPISODE OF MAJOR DEPRESSIVE DISORDER WITHOUT PRIOR EPISODE (HCC): ICD-10-CM

## 2022-01-02 RX ORDER — PAROXETINE 10 MG/1
10 TABLET, FILM COATED ORAL DAILY
Qty: 90 TABLET | Refills: 3 | Status: SHIPPED | OUTPATIENT
Start: 2022-01-02 | End: 2023-01-06

## 2022-02-11 DIAGNOSIS — E78.2 MIXED DYSLIPIDEMIA: ICD-10-CM

## 2022-02-11 RX ORDER — ATORVASTATIN CALCIUM 20 MG/1
TABLET, FILM COATED ORAL
Qty: 100 TABLET | Refills: 0 | Status: SHIPPED | OUTPATIENT
Start: 2022-02-11 | End: 2022-05-23

## 2022-05-22 DIAGNOSIS — E78.2 MIXED DYSLIPIDEMIA: ICD-10-CM

## 2022-05-23 RX ORDER — ATORVASTATIN CALCIUM 20 MG/1
TABLET, FILM COATED ORAL
Qty: 100 TABLET | Refills: 0 | Status: SHIPPED | OUTPATIENT
Start: 2022-05-23 | End: 2022-09-19

## 2022-05-26 ENCOUNTER — TELEPHONE (OUTPATIENT)
Dept: INTERNAL MEDICINE | Facility: IMAGING CENTER | Age: 67
End: 2022-05-26
Payer: MEDICARE

## 2022-05-26 DIAGNOSIS — U07.1 COVID-19 VIRUS INFECTION: ICD-10-CM

## 2022-05-31 DIAGNOSIS — J30.2 SEASONAL ALLERGIES: ICD-10-CM

## 2022-05-31 DIAGNOSIS — J34.89 SINUS DRAINAGE: ICD-10-CM

## 2022-05-31 DIAGNOSIS — R09.81 CONGESTION OF NASAL SINUS: ICD-10-CM

## 2022-07-01 ENCOUNTER — OFFICE VISIT (OUTPATIENT)
Dept: INTERNAL MEDICINE | Facility: IMAGING CENTER | Age: 67
End: 2022-07-01
Payer: MEDICARE

## 2022-07-01 VITALS
SYSTOLIC BLOOD PRESSURE: 124 MMHG | WEIGHT: 171.74 LBS | RESPIRATION RATE: 17 BRPM | HEART RATE: 75 BPM | OXYGEN SATURATION: 95 % | DIASTOLIC BLOOD PRESSURE: 72 MMHG | BODY MASS INDEX: 29.32 KG/M2 | HEIGHT: 64 IN | TEMPERATURE: 98.5 F

## 2022-07-01 DIAGNOSIS — Z00.00 HEALTH CARE MAINTENANCE: ICD-10-CM

## 2022-07-01 DIAGNOSIS — J30.2 SEASONAL ALLERGIES: ICD-10-CM

## 2022-07-01 DIAGNOSIS — Z71.85 VACCINE COUNSELING: ICD-10-CM

## 2022-07-01 DIAGNOSIS — M54.50 CHRONIC LEFT-SIDED LOW BACK PAIN WITHOUT SCIATICA: ICD-10-CM

## 2022-07-01 DIAGNOSIS — G89.29 CHRONIC LEFT-SIDED LOW BACK PAIN WITHOUT SCIATICA: ICD-10-CM

## 2022-07-01 DIAGNOSIS — N18.31 CHRONIC KIDNEY DISEASE, STAGE 3A: ICD-10-CM

## 2022-07-01 DIAGNOSIS — E78.2 MIXED DYSLIPIDEMIA: ICD-10-CM

## 2022-07-01 PROCEDURE — 99214 OFFICE O/P EST MOD 30 MIN: CPT | Performed by: FAMILY MEDICINE

## 2022-07-01 RX ORDER — METHYLPREDNISOLONE 4 MG/1
TABLET ORAL
Qty: 21 TABLET | Refills: 0 | Status: SHIPPED | OUTPATIENT
Start: 2022-07-01 | End: 2023-01-25

## 2022-07-01 RX ORDER — AZELASTINE 1 MG/ML
1 SPRAY, METERED NASAL 2 TIMES DAILY
Qty: 30 ML | Refills: 6 | Status: SHIPPED | OUTPATIENT
Start: 2022-07-01 | End: 2023-01-25

## 2022-07-01 ASSESSMENT — FIBROSIS 4 INDEX: FIB4 SCORE: 1.53

## 2022-07-05 NOTE — PROGRESS NOTES
"Chief Complaint   Patient presents with   • Leg Pain     Ha been present x1 month. Left leg sciatic pain. Started stretching and it helps but she would like to see if there is anything else that can help.        HPI:  Patient is a 67 y.o. female established patient who presents today for evaluation of new left lower back and upper leg pain that started approximately one month ago. Her condition was triggered by doing housework (vacuuming/mopping motion), and she describes it as a dull ache without typical sciatica pain. She has been doing supportive care measures including stretching but continues to have ongoing symptoms. She denies associated bowel or bladder involvement and is ambulating well today at our visit. She has fully recovered from recent COVID infection and denies lingering related symptoms.      Patient Active Problem List    Diagnosis Date Noted   • Chronic kidney disease, stage 3a ([facility]) 11/03/2021   • Osteopenia 11/03/2021   • Current moderate episode of major depressive disorder ([facility]) 04/29/2021   • Seasonal allergies 06/25/2020   • Gastroesophageal reflux disease without esophagitis 10/24/2019   • ISADORA (generalized anxiety disorder) 10/24/2019   • Mixed dyslipidemia 10/24/2019   • Family history of diabetes mellitus (DM) 10/24/2019       Past medical, surgical, family, and social history was reviewed and updated in Epic chart by me today.     Medications and allergies reviewed and updated in Epic chart by me today.     ROS:  Pertinent positives listed above in HPI. All other systems have been reviewed and are negative.    PE:   /72 (BP Location: Left arm, Patient Position: Sitting, BP Cuff Size: Adult)   Pulse 75   Temp 36.9 °C (98.5 °F) (Temporal)   Resp 17   Ht 1.626 m (5' 4\")   Wt 77.9 kg (171 lb 11.8 oz)   LMP 12/19/2012   SpO2 95%   BMI 29.48 kg/m²   Vital signs reviewed with patient.     Gen: Well developed; well nourished; no acute distress; age appropriate appearance   Back: no " midline pain; paraspinal muscle tightness and discomfort with palpation over left lower L spine area/sciatic notch but she denies typical sciatica symptoms; no pain or limitations with flexion and extension from hip level  Extremities: No peripheral edema b/l LE extremities/ no clubbing nor cyanosis noted  Skin: Warm and dry; no rashes noted   Neuro: No focal deficits noted; ambulating well and able to sit in chair and stand without assistance  Psych: AAOx4; mood and affect are appropriate    A/P:  1. Chronic left-sided low back pain without sciatica  New condition present for the past 1 month, described as a dull ache in left lumber spine area, that was triggered by doing housework (specifically vacuuming/mopping motion). We discussed condition at visit and recommend patient start Medrol dose pack today/ referral made to [facility] for PT sessions. New RX sent to pharmacy, and no indication for imaging at this time.   - methylPREDNISolone (MEDROL DOSEPAK) 4 MG Tablet Therapy Pack; As directed on the packaging label.  Dispense: 21 Tablet; Refill: 0  - Referral to Physical Therapy    2. Seasonal allergies  Ongoing issue for patient and recommend Astelin use. New RX sent to pharmacy.   - azelastine (ASTELIN) 137 MCG/SPRAY nasal spray; Administer 1 Spray into affected nostril(S) 2 times a day.  Dispense: 30 mL; Refill: 6    3. Chronic kidney disease, stage 3a (HCC)  Patient is due for fasting labs and urine testing for ongoing management.   - MICROALBUMIN CREAT RATIO URINE; Future  - Comp Metabolic Panel; Future    4. Mixed dyslipidemia  Patient is due for repeat fasting lipid panel for ongoing management of current Lipitor use.   - Lipid Profile; Future    5. Vaccine counseling  Patient educated about vaccine eligibility at visit today.    6. Health care maintenance  Patient encouraged to update her fasting labs ASAP for ongoing medical management.  - CBC WITH DIFFERENTIAL; Future  - TSH WITH REFLEX TO FT4;  Future  - VITAMIN D,25 HYDROXY; Future  - HEMOGLOBIN A1C; Future

## 2022-09-19 DIAGNOSIS — E78.2 MIXED DYSLIPIDEMIA: ICD-10-CM

## 2022-09-19 RX ORDER — ATORVASTATIN CALCIUM 20 MG/1
TABLET, FILM COATED ORAL
Qty: 30 TABLET | Refills: 0 | Status: SHIPPED | OUTPATIENT
Start: 2022-09-19 | End: 2022-11-03 | Stop reason: SDUPTHER

## 2022-09-27 DIAGNOSIS — D22.9 ENLARGED SKIN MOLE: ICD-10-CM

## 2022-11-03 ENCOUNTER — PATIENT MESSAGE (OUTPATIENT)
Dept: INTERNAL MEDICINE | Facility: IMAGING CENTER | Age: 67
End: 2022-11-03
Payer: MEDICARE

## 2022-11-03 DIAGNOSIS — E78.2 MIXED DYSLIPIDEMIA: ICD-10-CM

## 2022-11-03 RX ORDER — ATORVASTATIN CALCIUM 20 MG/1
20 TABLET, FILM COATED ORAL
Qty: 30 TABLET | Refills: 0 | Status: SHIPPED | OUTPATIENT
Start: 2022-11-03 | End: 2022-11-29

## 2022-11-11 ENCOUNTER — PATIENT MESSAGE (OUTPATIENT)
Dept: HEALTH INFORMATION MANAGEMENT | Facility: OTHER | Age: 67
End: 2022-11-11

## 2022-11-29 DIAGNOSIS — E78.2 MIXED DYSLIPIDEMIA: ICD-10-CM

## 2022-11-29 RX ORDER — ATORVASTATIN CALCIUM 20 MG/1
TABLET, FILM COATED ORAL
Qty: 30 TABLET | Refills: 0 | Status: SHIPPED | OUTPATIENT
Start: 2022-11-29 | End: 2023-01-12 | Stop reason: SDUPTHER

## 2022-12-31 DIAGNOSIS — E78.2 MIXED DYSLIPIDEMIA: ICD-10-CM

## 2023-01-03 RX ORDER — ATORVASTATIN CALCIUM 20 MG/1
TABLET, FILM COATED ORAL
Qty: 30 TABLET | Refills: 0 | OUTPATIENT
Start: 2023-01-03

## 2023-01-11 ENCOUNTER — NON-PROVIDER VISIT (OUTPATIENT)
Dept: INTERNAL MEDICINE | Facility: IMAGING CENTER | Age: 68
End: 2023-01-11
Payer: MEDICARE

## 2023-01-11 ENCOUNTER — HOSPITAL ENCOUNTER (OUTPATIENT)
Facility: MEDICAL CENTER | Age: 68
End: 2023-01-11
Attending: FAMILY MEDICINE
Payer: MEDICARE

## 2023-01-11 DIAGNOSIS — Z00.00 HEALTH CARE MAINTENANCE: ICD-10-CM

## 2023-01-11 DIAGNOSIS — E78.2 MIXED DYSLIPIDEMIA: ICD-10-CM

## 2023-01-11 DIAGNOSIS — N18.31 CHRONIC KIDNEY DISEASE, STAGE 3A: ICD-10-CM

## 2023-01-11 DIAGNOSIS — Z23 NEED FOR VACCINATION: ICD-10-CM

## 2023-01-11 PROCEDURE — 90662 IIV NO PRSV INCREASED AG IM: CPT | Performed by: FAMILY MEDICINE

## 2023-01-11 PROCEDURE — 83036 HEMOGLOBIN GLYCOSYLATED A1C: CPT

## 2023-01-11 PROCEDURE — G0008 ADMIN INFLUENZA VIRUS VAC: HCPCS | Performed by: FAMILY MEDICINE

## 2023-01-11 PROCEDURE — 80061 LIPID PANEL: CPT

## 2023-01-11 PROCEDURE — 82306 VITAMIN D 25 HYDROXY: CPT

## 2023-01-11 PROCEDURE — 82570 ASSAY OF URINE CREATININE: CPT

## 2023-01-11 PROCEDURE — 82043 UR ALBUMIN QUANTITATIVE: CPT

## 2023-01-11 PROCEDURE — 85025 COMPLETE CBC W/AUTO DIFF WBC: CPT

## 2023-01-11 PROCEDURE — 80053 COMPREHEN METABOLIC PANEL: CPT

## 2023-01-11 PROCEDURE — 84443 ASSAY THYROID STIM HORMONE: CPT

## 2023-01-12 DIAGNOSIS — E78.2 MIXED DYSLIPIDEMIA: ICD-10-CM

## 2023-01-12 LAB
25(OH)D3 SERPL-MCNC: 30 NG/ML (ref 30–100)
ALBUMIN SERPL BCP-MCNC: 4.7 G/DL (ref 3.2–4.9)
ALBUMIN/GLOB SERPL: 1.6 G/DL
ALP SERPL-CCNC: 92 U/L (ref 30–99)
ALT SERPL-CCNC: 28 U/L (ref 2–50)
ANION GAP SERPL CALC-SCNC: 14 MMOL/L (ref 7–16)
AST SERPL-CCNC: 32 U/L (ref 12–45)
BASOPHILS # BLD AUTO: 1.7 % (ref 0–1.8)
BASOPHILS # BLD: 0.09 K/UL (ref 0–0.12)
BILIRUB SERPL-MCNC: 0.5 MG/DL (ref 0.1–1.5)
BUN SERPL-MCNC: 15 MG/DL (ref 8–22)
CALCIUM ALBUM COR SERPL-MCNC: 8.9 MG/DL (ref 8.5–10.5)
CALCIUM SERPL-MCNC: 9.5 MG/DL (ref 8.5–10.5)
CHLORIDE SERPL-SCNC: 101 MMOL/L (ref 96–112)
CHOLEST SERPL-MCNC: 233 MG/DL (ref 100–199)
CO2 SERPL-SCNC: 25 MMOL/L (ref 20–33)
CREAT SERPL-MCNC: 1.13 MG/DL (ref 0.5–1.4)
CREAT UR-MCNC: 304.93 MG/DL
EOSINOPHIL # BLD AUTO: 0.19 K/UL (ref 0–0.51)
EOSINOPHIL NFR BLD: 3.5 % (ref 0–6.9)
ERYTHROCYTE [DISTWIDTH] IN BLOOD BY AUTOMATED COUNT: 51.8 FL (ref 35.9–50)
EST. AVERAGE GLUCOSE BLD GHB EST-MCNC: 108 MG/DL
GFR SERPLBLD CREATININE-BSD FMLA CKD-EPI: 53 ML/MIN/1.73 M 2
GLOBULIN SER CALC-MCNC: 2.9 G/DL (ref 1.9–3.5)
GLUCOSE SERPL-MCNC: 89 MG/DL (ref 65–99)
HBA1C MFR BLD: 5.4 % (ref 4–5.6)
HCT VFR BLD AUTO: 42.8 % (ref 37–47)
HDLC SERPL-MCNC: 95 MG/DL
HGB BLD-MCNC: 14.5 G/DL (ref 12–16)
IMM GRANULOCYTES # BLD AUTO: 0.02 K/UL (ref 0–0.11)
IMM GRANULOCYTES NFR BLD AUTO: 0.4 % (ref 0–0.9)
LDLC SERPL CALC-MCNC: 114 MG/DL
LYMPHOCYTES # BLD AUTO: 1.46 K/UL (ref 1–4.8)
LYMPHOCYTES NFR BLD: 27.2 % (ref 22–41)
MCH RBC QN AUTO: 32.8 PG (ref 27–33)
MCHC RBC AUTO-ENTMCNC: 33.9 G/DL (ref 33.6–35)
MCV RBC AUTO: 96.8 FL (ref 81.4–97.8)
MICROALBUMIN UR-MCNC: 12.6 MG/DL
MICROALBUMIN/CREAT UR: 41 MG/G (ref 0–30)
MONOCYTES # BLD AUTO: 0.47 K/UL (ref 0–0.85)
MONOCYTES NFR BLD AUTO: 8.8 % (ref 0–13.4)
NEUTROPHILS # BLD AUTO: 3.14 K/UL (ref 2–7.15)
NEUTROPHILS NFR BLD: 58.4 % (ref 44–72)
NRBC # BLD AUTO: 0 K/UL
NRBC BLD-RTO: 0 /100 WBC
PLATELET # BLD AUTO: 317 K/UL (ref 164–446)
PMV BLD AUTO: 9.6 FL (ref 9–12.9)
POTASSIUM SERPL-SCNC: 4.2 MMOL/L (ref 3.6–5.5)
PROT SERPL-MCNC: 7.6 G/DL (ref 6–8.2)
RBC # BLD AUTO: 4.42 M/UL (ref 4.2–5.4)
SODIUM SERPL-SCNC: 140 MMOL/L (ref 135–145)
TRIGL SERPL-MCNC: 119 MG/DL (ref 0–149)
TSH SERPL DL<=0.005 MIU/L-ACNC: 1.84 UIU/ML (ref 0.38–5.33)
WBC # BLD AUTO: 5.4 K/UL (ref 4.8–10.8)

## 2023-01-12 RX ORDER — ATORVASTATIN CALCIUM 20 MG/1
20 TABLET, FILM COATED ORAL
Qty: 90 TABLET | Refills: 0 | Status: SHIPPED | OUTPATIENT
Start: 2023-01-12 | End: 2023-01-25 | Stop reason: SDUPTHER

## 2023-01-25 ENCOUNTER — OFFICE VISIT (OUTPATIENT)
Dept: INTERNAL MEDICINE | Facility: IMAGING CENTER | Age: 68
End: 2023-01-25
Payer: MEDICARE

## 2023-01-25 VITALS
DIASTOLIC BLOOD PRESSURE: 76 MMHG | BODY MASS INDEX: 30.93 KG/M2 | HEIGHT: 61 IN | TEMPERATURE: 98.1 F | SYSTOLIC BLOOD PRESSURE: 126 MMHG | WEIGHT: 163.8 LBS | RESPIRATION RATE: 17 BRPM | HEART RATE: 66 BPM | OXYGEN SATURATION: 97 %

## 2023-01-25 DIAGNOSIS — E78.2 MIXED DYSLIPIDEMIA: ICD-10-CM

## 2023-01-25 DIAGNOSIS — J30.2 SEASONAL ALLERGIES: ICD-10-CM

## 2023-01-25 DIAGNOSIS — K21.9 GASTROESOPHAGEAL REFLUX DISEASE WITHOUT ESOPHAGITIS: ICD-10-CM

## 2023-01-25 DIAGNOSIS — Z23 NEED FOR VACCINATION: ICD-10-CM

## 2023-01-25 DIAGNOSIS — M85.852 OSTEOPENIA OF NECK OF LEFT FEMUR: ICD-10-CM

## 2023-01-25 DIAGNOSIS — N18.31 CHRONIC KIDNEY DISEASE, STAGE 3A: ICD-10-CM

## 2023-01-25 DIAGNOSIS — N39.3 STRESS INCONTINENCE: ICD-10-CM

## 2023-01-25 DIAGNOSIS — F32.1 CURRENT MODERATE EPISODE OF MAJOR DEPRESSIVE DISORDER WITHOUT PRIOR EPISODE (HCC): ICD-10-CM

## 2023-01-25 DIAGNOSIS — Z12.31 ENCOUNTER FOR SCREENING MAMMOGRAM FOR BREAST CANCER: ICD-10-CM

## 2023-01-25 DIAGNOSIS — Z00.00 WELCOME TO MEDICARE PREVENTIVE VISIT: ICD-10-CM

## 2023-01-25 DIAGNOSIS — Z00.00 HEALTH CARE MAINTENANCE: ICD-10-CM

## 2023-01-25 DIAGNOSIS — Z71.85 VACCINE COUNSELING: ICD-10-CM

## 2023-01-25 PROCEDURE — 93000 ELECTROCARDIOGRAM COMPLETE: CPT | Performed by: FAMILY MEDICINE

## 2023-01-25 PROCEDURE — 90677 PCV20 VACCINE IM: CPT | Performed by: FAMILY MEDICINE

## 2023-01-25 PROCEDURE — G0438 PPPS, INITIAL VISIT: HCPCS | Performed by: FAMILY MEDICINE

## 2023-01-25 PROCEDURE — G0009 ADMIN PNEUMOCOCCAL VACCINE: HCPCS | Performed by: FAMILY MEDICINE

## 2023-01-25 RX ORDER — FLUTICASONE PROPIONATE 50 MCG
1 SPRAY, SUSPENSION (ML) NASAL DAILY
COMMUNITY

## 2023-01-25 RX ORDER — ATORVASTATIN CALCIUM 20 MG/1
20 TABLET, FILM COATED ORAL
Qty: 90 TABLET | Refills: 3 | Status: SHIPPED | OUTPATIENT
Start: 2023-01-25

## 2023-01-25 ASSESSMENT — PATIENT HEALTH QUESTIONNAIRE - PHQ9
SUM OF ALL RESPONSES TO PHQ QUESTIONS 1-9: 0
3. TROUBLE FALLING OR STAYING ASLEEP OR SLEEPING TOO MUCH: NOT AT ALL
CLINICAL INTERPRETATION OF PHQ2 SCORE: 0
7. TROUBLE CONCENTRATING ON THINGS, SUCH AS READING THE NEWSPAPER OR WATCHING TELEVISION: NOT AT ALL
4. FEELING TIRED OR HAVING LITTLE ENERGY: NOT AT ALL
SUM OF ALL RESPONSES TO PHQ9 QUESTIONS 1 AND 2: 0
6. FEELING BAD ABOUT YOURSELF - OR THAT YOU ARE A FAILURE OR HAVE LET YOURSELF OR YOUR FAMILY DOWN: NOT AL ALL
8. MOVING OR SPEAKING SO SLOWLY THAT OTHER PEOPLE COULD HAVE NOTICED. OR THE OPPOSITE, BEING SO FIGETY OR RESTLESS THAT YOU HAVE BEEN MOVING AROUND A LOT MORE THAN USUAL: NOT AT ALL
9. THOUGHTS THAT YOU WOULD BE BETTER OFF DEAD, OR OF HURTING YOURSELF: NOT AT ALL
5. POOR APPETITE OR OVEREATING: NOT AT ALL
2. FEELING DOWN, DEPRESSED, IRRITABLE, OR HOPELESS: NOT AT ALL
1. LITTLE INTEREST OR PLEASURE IN DOING THINGS: NOT AT ALL

## 2023-01-25 ASSESSMENT — FIBROSIS 4 INDEX: FIB4 SCORE: 1.26

## 2023-01-25 ASSESSMENT — ACTIVITIES OF DAILY LIVING (ADL): BATHING_REQUIRES_ASSISTANCE: 0

## 2023-01-25 ASSESSMENT — ENCOUNTER SYMPTOMS: GENERAL WELL-BEING: GOOD

## 2023-01-25 NOTE — PROGRESS NOTES
CC:   Medicare Annual Wellness Visit    HPI:  Sanjuanita is a 67 y.o. female here for her Welcome to Medicare Wellness Visit and to review labs done 1/11/23. 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 She has chronic mixed dyslipidemia with statin compliance, and history of depression and ISADORA managed with low dose paxil use. She has chronic GERD with ongoing PPI use, seasonal allergies, and CKD Stage 3. She is aware of vaccine eligibility, is due for annual screening mammogram, and needs an updated dermatology referral for annual skin check. She reports six month history of bladder leakage when coughing specifically and is seeking management options. She denies new mental health concerns and is in good spirits today.     Patient Active Problem List    Diagnosis Date Noted    Vaccine counseling 01/25/2023    Stress incontinence 01/25/2023    Chronic kidney disease, stage 3a (HCC) 11/03/2021    Osteopenia 11/03/2021    Current moderate episode of major depressive disorder (HCC) 04/29/2021    Seasonal allergies 06/25/2020    Gastroesophageal reflux disease without esophagitis 10/24/2019    ISADORA (generalized anxiety disorder) 10/24/2019    Mixed dyslipidemia 10/24/2019    Family history of diabetes mellitus (DM) 10/24/2019     Current Outpatient Medications   Medication Sig Dispense Refill    fluticasone (FLONASE) 50 MCG/ACT nasal spray Administer 1 Spray into affected nostril(S) every day.      atorvastatin (LIPITOR) 20 MG Tab Take 1 Tablet by mouth at bedtime. 90 Tablet 3    PARoxetine (PAXIL) 10 MG Tab TAKE 1 TABLET BY MOUTH EVERY DAY 90 Tablet 3    omeprazole (PRILOSEC) 20 MG delayed-release capsule Take 20 mg by mouth every day.      Levocetirizine Dihydrochloride 5 MG Tab Take 1 tablet by mouth at bedtime.      famotidine (PEPCID) 20 MG Tab Take 1 Tab by mouth 2 times a day. 180 Tab 3    Multiple Vitamins-Minerals (MULTIVITAMIN PO) Take  by mouth every day.        Calcium  Carbonate-Vitamin D (CALCIUM + D PO) Take  by mouth every day.         No current facility-administered medications for this visit.      Current supplements: see MAR  Chronic narcotic pain medicines: no  Allergies: Tetracycline  Exercise: yes  Current social contact/activities: yes  Current mood: good  Advance Directive on file: no    Screening:  Depression Screening  Little interest or pleasure in doing things?  0 - not at all  Feeling down, depressed , or hopeless? 0 - not at all  Patient Health Questionnaire Score: 0     If depressive symptoms identified deferred to follow up visit unless specifically addressed in assessment and plan.    Interpretation of PHQ-9 Total Score   Score Severity   1-4 No Depression   5-9 Mild Depression   10-14 Moderate Depression   15-19 Moderately Severe Depression   20-27 Severe Depression    Screening for Cognitive Impairment  Three Minute Recall (daughter, heaven, mountain) 3/3    Cesar clock face with all 12 numbers and set the hands to show 10 past 11.  Yes    Cognitive concerns identified deferred for follow up unless specifically addressed in assessment and plan.    Fall Risk Assessment  Has the patient had two or more falls in the last year or any fall with injury in the last year?  No    Safety Assessment  Throw rugs on floor.  No  Handrails on all stairs.  Yes  Good lighting in all hallways.  Yes  Difficulty hearing.  No  Patient counseled about all safety risks that were identified.    Functional Assessment ADLs  Are there any barriers preventing you from cooking for yourself or meeting nutritional needs?  No.    Are there any barriers preventing you from driving safely or obtaining transportation?  No.    Are there any barriers preventing you from using a telephone or calling for help?  No.    Are there any barriers preventing you from shopping?  No.    Are there any barriers preventing you from taking care of your own finances?  No.    Are there any barriers preventing  you from managing your medications?  No.    Are there any barriers preventing you from showering, bathing or dressing yourself?  No.    Are you currently engaging in any exercise or physical activity?  Yes.     What is your perception of your health?  Good    Advance Care Planning  Do you have an Advance Directive, Living Will, Durable Power of , or POLST?                   Health Maintenance Summary            Overdue - COVID-19 Vaccine (4 - Booster for Pfizer series) Overdue since 3/2/2022      01/05/2022  Imm Admin: PFIZER PURPLE CAP SARS-COV-2 VACCINATION (12+)    03/23/2021  Imm Admin: PFIZER PURPLE CAP SARS-COV-2 VACCINATION (12+)    03/02/2021  Imm Admin: PFIZER PURPLE CAP SARS-COV-2 VACCINATION (12+)              Ordered - MAMMOGRAM (Yearly) Ordered on 1/25/2023 12/22/2021  MA-SCREENING MAMMO BILAT W/TOMOSYNTHESIS W/CAD    12/27/2019  MA-SCREENING MAMMO BILAT W/TOMOSYNTHESIS W/CAD              Annual Wellness Visit (Every 366 Days) Next due on 1/26/2024 01/25/2023  Visit Dx: Welcome to Medicare preventive visit    01/25/2023  Initial Annual Wellness Visit - Includes PPPS ()    11/03/2021  Level of Service: LA ANNUAL WELLNESS VISIT-INCLUDES PPPS SUBSEQUE*              COLORECTAL CANCER SCREENING (COLONOSCOPY - Every 7 Years) Next due on 2/24/2024 02/24/2017  REFERRAL TO GI FOR COLONOSCOPY              BONE DENSITY (Every 5 Years) Next due on 12/27/2024 12/27/2019  DS-BONE DENSITY STUDY (DEXA)              IMM DTaP/Tdap/Td Vaccine (2 - Td or Tdap) Next due on 10/24/2029      10/24/2019  Imm Admin: Tdap Vaccine              IMM ZOSTER VACCINES (Series Information) Completed      12/31/2020  Imm Admin: Zoster Vaccine Recombinant (RZV) (SHINGRIX)    07/31/2020  Imm Admin: Zoster Vaccine Recombinant (RZV) (SHINGRIX)              IMM INFLUENZA (Series Information) Completed      01/11/2023  Imm Admin: Influenza Vaccine Adult HD    11/16/2021  Imm Admin: Influenza Vaccine Adult HD     2020  Imm Admin: Influenza Vaccine Quad Inj (Pf)    10/03/2019  Imm Admin: Influenza Vaccine Quad Inj (Pf)    2018  Imm Admin: Influenza Vaccine Quad Inj (Pf)    Only the first 5 history entries have been loaded, but more history exists.              IMM PNEUMOCOCCAL VACCINE: 65+ Years (Series Information) Completed      2023  Imm Admin: Pneumococcal Conjugate Vaccine (PCV20)    2021  Imm Admin: Pneumococcal polysaccharide vaccine (PPSV-23)              IMM MENINGOCOCCAL ACWY VACCINE (Series Information) Aged Out      No completion history exists for this topic.              Discontinued - CERVICAL CANCER SCREENING  Discontinued        Frequency changed to Never automatically (Topic No Longer Applies)    2021  PAP Only              Discontinued - IMM HEP B VACCINE  Discontinued      No completion history exists for this topic.              Discontinued - HEPATITIS C SCREENING  Discontinued      No completion history exists for this topic.                    Patient Care Team:  Angela Almonte M.D. as PCP - General (Family Medicine)      Social History     Tobacco Use    Smoking status: Former     Packs/day: 0.20     Years: 10.00     Pack years: 2.00     Types: Cigarettes     Quit date: 1997     Years since quittin.0    Smokeless tobacco: Never   Vaping Use    Vaping Use: Never used   Substance Use Topics    Alcohol use: Yes     Comment: occassional    Drug use: No     Family History   Problem Relation Age of Onset    Diabetes Mother     Heart Disease Father         die from MI    Diabetes Sister     Cancer Brother         Hodgkins    Cancer Brother         Melanoma    Cancer Maternal Grandmother         Breast     She  has a past medical history of GERD (gastroesophageal reflux disease) and Hyperlipidemia.    She has no past medical history of Encounter for long-term (current) use of other medications.   Past Surgical History:   Procedure Laterality Date    SHOULDER  "ARTHROSCOPY W/ ROTATOR CUFF REPAIR  2/7/2013    Performed by Dakota Sawyer M.D. at SURGERY [facility]    SHOULDER DECOMPRESSION ARTHROSCOPIC  2/7/2013    Performed by Dakota Sawyer M.D. at SURGERY [facility]    SHOULDER ARTHROSCOPY W/ BICIPITAL TENODESIS REPAIR  2/7/2013    Performed by Dakota Sawyer M.D. at SURGERY [facility]    ORIF, ANKLE Bilateral     ankle pinning       ROS:    All positives noted in HPI. All others reviewed and are negative.    Ostomy or other tubes or amputations: no  Chronic oxygen use: no  Last eye exam: UTD per report  : stress urinary incontinence when coughing; does not interfere with ADLs/ sleep  Gait: stable  Problems with balance/ difficulty walking: no  Hearing: good   Dentition: adequate    Lab results 1/11/23 reviewed with patient at visit today.    EKG done at visit and interpreted by me: NSR HR:71    Exam:   /76 (BP Location: Left arm, Patient Position: Sitting, BP Cuff Size: Adult)   Pulse 66   Temp 36.7 °C (98.1 °F) (Temporal)   Resp 17   Ht 1.549 m (5' 1\")   Wt 74.3 kg (163 lb 12.8 oz)   SpO2 97%  Body mass index is 30.95 kg/m².    Gen: Well developed; well nourished; no acute distress; age appropriate appearance   HEENT: Normocephalic; atraumatic; PEERLA b/l; sclera clear b/l; b/l external auditory canals WNL; b/l TM WNL; nares patent; oropharynx clear; mask in place  Neck: No adenopathy; no thyromegaly  CV: Regular rate and rhythm; S1/ S2 present; no murmur, gallop or rub noted  Pulm: No respiratory distress; clear to ascultation b/l; no wheezing or stridor noted b/l  Abd: Adequate bowel sounds noted; soft and nontender; no rebound, rigidity, nor distention  Extremities: No peripheral edema b/l LE extremities/ no clubbing nor cyanosis noted  Skin: Warm and dry; no rashes noted   Neuro: No focal deficits noted; pt is able to get up out of chair unassisted and walk forward  Psych: AAOx4; mood and affect are appropriate    Assessment and " Plan:  1. Mixed dyslipidemia  Markedly improved as compared to two prior years of lipid panel monitoring. Recommend patient continue current Lipitor use, healthy diet choices, and exercise as able. New RX sent to pharmacy.   - atorvastatin (LIPITOR) 20 MG Tab; Take 1 Tablet by mouth at bedtime.  Dispense: 90 Tablet; Refill: 3  - Initial Annual Wellness Visit - Includes PPPS ()    2. Encounter for screening mammogram for breast cancer  Patient is due for annual screening mammogram, and I provided her with [facility] phone number to call for appt scheduling.   - MA-SCREENING MAMMO BILAT W/TOMOSYNTHESIS W/CAD; Future  - Initial Annual Wellness Visit - Includes PPPS ()    3. Need for vaccination  Vaccine administered at visit today.  - Pneumococcal Conjugate Vaccine 20-Valent (19 yrs+)  - Initial Annual Wellness Visit - Includes PPPS ()    4. Vaccine counseling  Patient is aware of vaccine eligibility  - Initial Annual Wellness Visit - Includes PPPS ()    5. Chronic kidney disease, stage 3a (HCC)  Stable for the past ten years  - Initial Annual Wellness Visit - Includes PPPS ()    6. Current moderate episode of major depressive disorder without prior episode (HCC)  Stable with low dose Paxil use.   - Initial Annual Wellness Visit - Includes PPPS ()    7. Gastroesophageal reflux disease without esophagitis  Stable with PPI use  - Initial Annual Wellness Visit - Includes PPPS ()    8. Seasonal allergies  Not a current concern  - Initial Annual Wellness Visit - Includes PPPS ()    9. Osteopenia of neck of left femur  Stable/ recommend patient continue weight bearing exercise, fall precautions, and adequate daily calcium/Vitamin D intake (recommend she take one extra dose of Vitamin D weekly).   - Initial Annual Wellness Visit - Includes PPPS ()    10. Stress incontinence  New condition present for the past six months, triggered when coughing. Condition reviewed at visit  today, and patient would like to start with conservative measures: regular Kegel exercises, frequent bladder emptying, wear pad as needed  - Initial Annual Wellness Visit - Includes PPPS ()    11. Health care maintenance  Referral made to [facility] for annual skin check.   - Referral to Dermatology  - Initial Annual Wellness Visit - Includes PPPS ()    12. Welcome to Medicare preventive visit  Patient is stable and remains well informed about medical conditions that need additional attention moving forward.   - Initial Annual Wellness Visit - Includes PPPS ()       Services needed: no new services needed at this time  Health Care Screening: recommendations as per orders if indicated.  Referrals offered: [facility]   Counseling provided today:  Prevent falls and reduce trip hazards; Secure or remove rugs if present   Maintain working fire alarm and carbon monoxide detectors   Engage in regular physical activity daily and social activities weekly as tolerated

## 2023-02-02 ENCOUNTER — HOSPITAL ENCOUNTER (OUTPATIENT)
Dept: RADIOLOGY | Facility: MEDICAL CENTER | Age: 68
End: 2023-02-02
Attending: FAMILY MEDICINE
Payer: MEDICARE

## 2023-02-02 DIAGNOSIS — Z12.31 ENCOUNTER FOR SCREENING MAMMOGRAM FOR BREAST CANCER: ICD-10-CM

## 2023-02-02 PROCEDURE — 77063 BREAST TOMOSYNTHESIS BI: CPT

## 2023-04-18 ENCOUNTER — OFFICE VISIT (OUTPATIENT)
Dept: DERMATOLOGY | Facility: IMAGING CENTER | Age: 68
End: 2023-04-18
Payer: MEDICARE

## 2023-04-18 DIAGNOSIS — D22.9 MULTIPLE NEVI: ICD-10-CM

## 2023-04-18 DIAGNOSIS — Z12.83 SKIN CANCER SCREENING: ICD-10-CM

## 2023-04-18 DIAGNOSIS — L81.4 LENTIGINES: ICD-10-CM

## 2023-04-18 DIAGNOSIS — D18.01 CHERRY ANGIOMA: ICD-10-CM

## 2023-04-18 DIAGNOSIS — L82.1 SK (SEBORRHEIC KERATOSIS): ICD-10-CM

## 2023-04-18 PROCEDURE — 99213 OFFICE O/P EST LOW 20 MIN: CPT | Performed by: NURSE PRACTITIONER

## 2023-04-18 NOTE — PROGRESS NOTES
DERMATOLOGY NOTE  NEW VISIT       Chief complaint: Establish Care     HPI/location: mole on back and lt leg   Time present:   Painful lesion: No  Itching lesion: No  Enlarging lesion: No  Anything make it better or worse?    History of skin cancer: No  History of precancers/actinic keratoses: No  History of biopsies:Yes, Details: rt shoulder mole benign   History of blistering/severe sunburns:Yes, Details: in youth  Family history of skin cancer:Yes, Details: brother melanoma   Family history of atypical moles:No    Allergies   Allergen Reactions    Tetracycline Nausea     MEDICATIONS:  Medications relevant to specialty reviewed.     REVIEW OF SYSTEMS:   Positive for skin (see HPI)  Negative for fevers and chills    EXAM:  LMP 12/19/2012   Constitutional: Well-developed, well-nourished, and in no distress.     A total body skin exam was performed excluding the genitals per patient preference and including the following areas: head (including face), neck, chest, abdomen, groin/buttocks, back, bilateral upper extremities, and bilateral lower extremities with the following pertinent findings listed below and/or in assessment/plan.     -sun exposed skin of trunk and b/l upper, lower extremities and face with scattered clinically benign light brown reticulated macules all of which were morphologically similar and none of which were suspicious for skin cancer today on exam  -Several scattered 1-3mm bright red macules and thin papules on the trunk and extremities  -Multiple tan medium and dark brown skin-colored macules papules scattered over the trunk >> extremities--All with benign-appearing pigment network patterns on dermoscopy  -Several medium brown stuck-on waxy papules scattered on the trunk and extremities    IMPRESSION / PLAN:    1. Lentigines  - Benign-appearing nature of lesions discussed during exam.   - Advised to continue to monitor for any return to clinic for new or concerning changes.      2. Cherry  angioma  - Benign-appearing nature of lesions discussed during exam.   - Advised to continue to monitor for any return to clinic for new or concerning changes.      3. Multiple nevi  - Benign-appearing nature of lesions discussed during exam.   - Advised to continue to monitor for any return to clinic for new or concerning changes.  - ABCDE's of melanoma discussed/handout given      4. SK (seborrheic keratosis)  - Benign-appearing nature of lesions discussed during exam.   - Advised to continue to monitor for any return to clinic for new or concerning changes.      5. Skin cancer screening  Skin cancer education  discussed importance of sun protective clothing, eyewear in addition to the use of broad spectrum sunscreen with SPF 30 or greater, as well as need for reapplication ~every 2 hours when exposed to UVR  discussed importance following up for any new or changing lesions as noted in handout given, but every 12 months exams in clinic in the setting of dermatologic history  ABCDE's of melanoma discussed/handout given        Please note that this dictation was created using voice recognition software. I have made every reasonable attempt to correct obvious errors, but I expect that there are errors of grammar and possibly content that I did not discover before finalizing the note.      Return to clinic in: Return in about 1 year (around 4/18/2024) for MJ. and as needed for any new or changing skin lesions.

## 2023-07-01 ENCOUNTER — TELEMEDICINE (OUTPATIENT)
Dept: TELEHEALTH | Facility: TELEMEDICINE | Age: 68
End: 2023-07-01
Payer: MEDICARE

## 2023-07-01 VITALS — BODY MASS INDEX: 26.98 KG/M2 | HEIGHT: 64 IN | WEIGHT: 158 LBS

## 2023-07-01 DIAGNOSIS — H10.9 BACTERIAL CONJUNCTIVITIS: ICD-10-CM

## 2023-07-01 RX ORDER — OFLOXACIN 3 MG/ML
1 SOLUTION/ DROPS OPHTHALMIC 4 TIMES DAILY
Qty: 5 ML | Refills: 1 | Status: SHIPPED | OUTPATIENT
Start: 2023-07-01 | End: 2023-07-02 | Stop reason: SDUPTHER

## 2023-07-01 ASSESSMENT — FIBROSIS 4 INDEX: FIB4 SCORE: 1.28

## 2023-07-02 DIAGNOSIS — H10.9 BACTERIAL CONJUNCTIVITIS: ICD-10-CM

## 2023-07-02 RX ORDER — OFLOXACIN 3 MG/ML
1 SOLUTION/ DROPS OPHTHALMIC 4 TIMES DAILY
Qty: 5 ML | Refills: 1 | Status: SHIPPED | OUTPATIENT
Start: 2023-07-02 | End: 2023-07-09

## 2023-07-03 DIAGNOSIS — H10.9 BACTERIAL CONJUNCTIVITIS: ICD-10-CM

## 2023-07-03 RX ORDER — CIPROFLOXACIN HYDROCHLORIDE 3.5 MG/ML
1 SOLUTION/ DROPS TOPICAL 4 TIMES DAILY
Qty: 2 ML | Refills: 0 | Status: SHIPPED | OUTPATIENT
Start: 2023-07-03 | End: 2023-07-10

## 2023-08-25 DIAGNOSIS — U07.1 COVID-19 VIRUS INFECTION: ICD-10-CM

## 2023-10-24 ENCOUNTER — TELEPHONE (OUTPATIENT)
Dept: HEALTH INFORMATION MANAGEMENT | Facility: OTHER | Age: 68
End: 2023-10-24
Payer: MEDICARE

## 2023-12-06 ENCOUNTER — OFFICE VISIT (OUTPATIENT)
Dept: URGENT CARE | Facility: PHYSICIAN GROUP | Age: 68
End: 2023-12-06
Payer: MEDICARE

## 2023-12-06 VITALS
SYSTOLIC BLOOD PRESSURE: 128 MMHG | HEIGHT: 61 IN | WEIGHT: 165.4 LBS | DIASTOLIC BLOOD PRESSURE: 76 MMHG | RESPIRATION RATE: 16 BRPM | TEMPERATURE: 98.2 F | HEART RATE: 92 BPM | BODY MASS INDEX: 31.23 KG/M2 | OXYGEN SATURATION: 93 %

## 2023-12-06 DIAGNOSIS — J01.90 ACUTE BACTERIAL SINUSITIS: ICD-10-CM

## 2023-12-06 DIAGNOSIS — B96.89 ACUTE BACTERIAL SINUSITIS: ICD-10-CM

## 2023-12-06 PROCEDURE — 3074F SYST BP LT 130 MM HG: CPT | Performed by: REGISTERED NURSE

## 2023-12-06 PROCEDURE — 99213 OFFICE O/P EST LOW 20 MIN: CPT | Performed by: REGISTERED NURSE

## 2023-12-06 PROCEDURE — 3078F DIAST BP <80 MM HG: CPT | Performed by: REGISTERED NURSE

## 2023-12-06 RX ORDER — AMOXICILLIN AND CLAVULANATE POTASSIUM 875; 125 MG/1; MG/1
1 TABLET, FILM COATED ORAL 2 TIMES DAILY
Qty: 20 TABLET | Refills: 0 | Status: SHIPPED | OUTPATIENT
Start: 2023-12-06 | End: 2023-12-06

## 2023-12-06 RX ORDER — AMOXICILLIN AND CLAVULANATE POTASSIUM 875; 125 MG/1; MG/1
1 TABLET, FILM COATED ORAL 2 TIMES DAILY
Qty: 20 TABLET | Refills: 0 | Status: SHIPPED | OUTPATIENT
Start: 2023-12-06 | End: 2023-12-16

## 2023-12-06 ASSESSMENT — ENCOUNTER SYMPTOMS
FEVER: 0
SHORTNESS OF BREATH: 0
DIZZINESS: 0
CHILLS: 0
NECK PAIN: 0

## 2023-12-06 ASSESSMENT — FIBROSIS 4 INDEX: FIB4 SCORE: 1.28

## 2023-12-07 NOTE — PROGRESS NOTES
"Subjective:   Sanjuanita Flores is a 68 y.o. female who presents for Sinus Problem (Sinus plugged x2 weeks, headaches)      HPI  2 weeks of congestion, sinus pressure, occasional thick drainage. Symptoms were preceded by a cold then shifted to the sinus region. Using sinus rinses. No hx of sinus surgeries, heart or lung issues. No recent antibiotic use. Immunizations current.     Denies high fever over 102, eye pain, acute vision changes, neck stiffness, equilibrium disturbances, unilateral/bilateral weakness    Review of Systems   Constitutional:  Negative for chills and fever.   Respiratory:  Negative for shortness of breath.    Cardiovascular:  Negative for chest pain.   Musculoskeletal:  Negative for neck pain.   Skin:  Negative for rash.   Neurological:  Negative for dizziness.     Medications, Allergies, and current problem list reviewed today in Epic.     Objective:     /76 (BP Location: Right arm, Patient Position: Sitting, BP Cuff Size: Adult)   Pulse 92   Temp 36.8 °C (98.2 °F) (Temporal)   Resp 16   Ht 1.549 m (5' 1\")   Wt 75 kg (165 lb 6.4 oz)   SpO2 93%     Physical Exam  Vitals and nursing note reviewed.   Constitutional:       General: She is not in acute distress.     Appearance: Normal appearance. She is well-developed. She is not ill-appearing, toxic-appearing or diaphoretic.   HENT:      Head: Normocephalic and atraumatic.      Right Ear: Hearing, tympanic membrane, ear canal and external ear normal. No decreased hearing noted. Tympanic membrane is not erythematous.      Left Ear: Hearing, tympanic membrane, ear canal and external ear normal. No decreased hearing noted. Tympanic membrane is not erythematous.      Nose: Mucosal edema, congestion and rhinorrhea present. Rhinorrhea is purulent.      Right Turbinates: Swollen.      Left Turbinates: Swollen.      Right Sinus: Maxillary sinus tenderness present.      Left Sinus: Maxillary sinus tenderness present.      Mouth/Throat:      " Mouth: Mucous membranes are moist.      Dentition: Normal dentition.      Pharynx: Posterior oropharyngeal erythema present. No oropharyngeal exudate.   Eyes:      General: No scleral icterus.        Right eye: No discharge.         Left eye: No discharge.      Conjunctiva/sclera: Conjunctivae normal.   Cardiovascular:      Rate and Rhythm: Normal rate and regular rhythm.      Pulses: Normal pulses.      Heart sounds: Normal heart sounds. No murmur heard.  Pulmonary:      Effort: Pulmonary effort is normal. No respiratory distress.      Breath sounds: Normal breath sounds. No wheezing, rhonchi or rales.   Musculoskeletal:      Cervical back: Normal range of motion and neck supple.   Lymphadenopathy:      Cervical: No cervical adenopathy.   Skin:     General: Skin is warm and dry.      Nails: There is no clubbing.   Neurological:      General: No focal deficit present.      Mental Status: She is alert and oriented to person, place, and time. Mental status is at baseline.   Psychiatric:         Mood and Affect: Mood normal.         Behavior: Behavior normal.         Thought Content: Thought content normal.         Judgment: Judgment normal.       Creatinine Clearance (Cockcroft-Gault Equation) from [employer]  on 12/6/2023  ** All calculations should be rechecked by clinician prior to use **    RESULT SUMMARY:  57 mL/min  Creatinine clearance, original Cockcroft-Gault    Labs from 01/2023  INPUTS:  Sex --> 1 = Female  Age --> 68 years  Weight --> 165 lbs  Creatinine --> 1.13 mg/dL    Assessment/Plan:     1. Acute bacterial sinusitis  amoxicillin-clavulanate (AUGMENTIN) 875-125 MG Tab    DISCONTINUED: amoxicillin-clavulanate (AUGMENTIN) 875-125 MG Tab        Differential diagnosis discussed     2 weeks of symptoms, started with cold and transition into the sinuses, pressure congestion thick drainage and mucus it is hard to get out.  No red flag signs or symptoms.  Vital signs are reassuring.  Exam is consistent with  sinus infection.  Does have a history of kidney disease calculated her estimated creatinine clearance which is 57 mL/min based on up-to-date no dose adjustment needed for Augmentin.  Continue with sinus rinses.    Return to clinic or go to ED if symptoms worsen or persist. Indications for ED discussed at length. Patient/Parent/Guardian voices understanding.     Follow-up with your primary care provider in 3-5 days if needed.     Red flag symptoms discussed. All side effects of medication discussed including allergic response, GI upset, tendon injury, rash, sedation etc.    I personally reviewed prior external notes and test results pertinent to today's visit as well as additional imaging and testing completed in clinic today.     Please note that this dictation was created using voice recognition software. I have made every reasonable attempt to correct obvious errors, but I expect that there are errors of grammar and possibly content that I did not discover before finalizing the note.    This note was electronically signed by ONEIL Rebolledo

## 2024-01-17 ENCOUNTER — HOSPITAL ENCOUNTER (OUTPATIENT)
Facility: MEDICAL CENTER | Age: 69
End: 2024-01-17
Attending: FAMILY MEDICINE
Payer: MEDICARE

## 2024-01-17 PROCEDURE — 80061 LIPID PANEL: CPT

## 2024-01-17 PROCEDURE — 80053 COMPREHEN METABOLIC PANEL: CPT

## 2024-01-18 LAB
ALBUMIN SERPL BCP-MCNC: 4.8 G/DL (ref 3.2–4.9)
ALBUMIN/GLOB SERPL: 1.5 G/DL
ALP SERPL-CCNC: 86 U/L (ref 30–99)
ALT SERPL-CCNC: 38 U/L (ref 2–50)
ANION GAP SERPL CALC-SCNC: 14 MMOL/L (ref 7–16)
AST SERPL-CCNC: 40 U/L (ref 12–45)
BILIRUB SERPL-MCNC: 0.5 MG/DL (ref 0.1–1.5)
BUN SERPL-MCNC: 18 MG/DL (ref 8–22)
CALCIUM ALBUM COR SERPL-MCNC: 8.9 MG/DL (ref 8.5–10.5)
CALCIUM SERPL-MCNC: 9.5 MG/DL (ref 8.5–10.5)
CHLORIDE SERPL-SCNC: 105 MMOL/L (ref 96–112)
CHOLEST SERPL-MCNC: 215 MG/DL (ref 100–199)
CO2 SERPL-SCNC: 24 MMOL/L (ref 20–33)
CREAT SERPL-MCNC: 0.95 MG/DL (ref 0.5–1.4)
GFR SERPLBLD CREATININE-BSD FMLA CKD-EPI: 65 ML/MIN/1.73 M 2
GLOBULIN SER CALC-MCNC: 3.3 G/DL (ref 1.9–3.5)
GLUCOSE SERPL-MCNC: 85 MG/DL (ref 65–99)
HDLC SERPL-MCNC: 101 MG/DL
LDLC SERPL CALC-MCNC: 101 MG/DL
POTASSIUM SERPL-SCNC: 4.8 MMOL/L (ref 3.6–5.5)
PROT SERPL-MCNC: 8.1 G/DL (ref 6–8.2)
SODIUM SERPL-SCNC: 143 MMOL/L (ref 135–145)
TRIGL SERPL-MCNC: 63 MG/DL (ref 0–149)

## 2024-02-21 ENCOUNTER — HOSPITAL ENCOUNTER (OUTPATIENT)
Dept: RADIOLOGY | Facility: MEDICAL CENTER | Age: 69
End: 2024-02-21
Attending: FAMILY MEDICINE
Payer: MEDICARE

## 2024-02-21 DIAGNOSIS — M85.80 OTHER SPECIFIED DISORDERS OF BONE DENSITY AND STRUCTURE, UNSPECIFIED SITE: ICD-10-CM

## 2024-02-21 DIAGNOSIS — Z12.31 ENCOUNTER FOR SCREENING MAMMOGRAM FOR MALIGNANT NEOPLASM OF BREAST: ICD-10-CM

## 2024-02-21 DIAGNOSIS — N95.9 MENOPAUSAL AND PERIMENOPAUSAL DISORDER: ICD-10-CM

## 2024-02-21 PROCEDURE — 77080 DXA BONE DENSITY AXIAL: CPT

## 2024-02-21 PROCEDURE — 77067 SCR MAMMO BI INCL CAD: CPT

## 2024-06-12 ENCOUNTER — HOSPITAL ENCOUNTER (OUTPATIENT)
Facility: MEDICAL CENTER | Age: 69
End: 2024-06-12
Attending: FAMILY MEDICINE
Payer: MEDICARE

## 2024-06-12 PROCEDURE — 80053 COMPREHEN METABOLIC PANEL: CPT

## 2024-06-12 PROCEDURE — 85025 COMPLETE CBC W/AUTO DIFF WBC: CPT

## 2024-06-13 LAB
ALBUMIN SERPL BCP-MCNC: 4.5 G/DL (ref 3.2–4.9)
ALBUMIN/GLOB SERPL: 1.3 G/DL
ALP SERPL-CCNC: 93 U/L (ref 30–99)
ALT SERPL-CCNC: 27 U/L (ref 2–50)
ANION GAP SERPL CALC-SCNC: 15 MMOL/L (ref 7–16)
AST SERPL-CCNC: 32 U/L (ref 12–45)
BASOPHILS # BLD AUTO: 1.6 % (ref 0–1.8)
BASOPHILS # BLD: 0.08 K/UL (ref 0–0.12)
BILIRUB SERPL-MCNC: 0.5 MG/DL (ref 0.1–1.5)
BUN SERPL-MCNC: 11 MG/DL (ref 8–22)
CALCIUM ALBUM COR SERPL-MCNC: 9.4 MG/DL (ref 8.5–10.5)
CALCIUM SERPL-MCNC: 9.8 MG/DL (ref 8.5–10.5)
CHLORIDE SERPL-SCNC: 101 MMOL/L (ref 96–112)
CO2 SERPL-SCNC: 23 MMOL/L (ref 20–33)
CREAT SERPL-MCNC: 0.83 MG/DL (ref 0.5–1.4)
EOSINOPHIL # BLD AUTO: 0.34 K/UL (ref 0–0.51)
EOSINOPHIL NFR BLD: 6.8 % (ref 0–6.9)
ERYTHROCYTE [DISTWIDTH] IN BLOOD BY AUTOMATED COUNT: 50 FL (ref 35.9–50)
GFR SERPLBLD CREATININE-BSD FMLA CKD-EPI: 77 ML/MIN/1.73 M 2
GLOBULIN SER CALC-MCNC: 3.4 G/DL (ref 1.9–3.5)
GLUCOSE SERPL-MCNC: 82 MG/DL (ref 65–99)
HCT VFR BLD AUTO: 43.7 % (ref 37–47)
HGB BLD-MCNC: 14.7 G/DL (ref 12–16)
IMM GRANULOCYTES # BLD AUTO: 0.01 K/UL (ref 0–0.11)
IMM GRANULOCYTES NFR BLD AUTO: 0.2 % (ref 0–0.9)
LYMPHOCYTES # BLD AUTO: 1.47 K/UL (ref 1–4.8)
LYMPHOCYTES NFR BLD: 29.3 % (ref 22–41)
MCH RBC QN AUTO: 32.8 PG (ref 27–33)
MCHC RBC AUTO-ENTMCNC: 33.6 G/DL (ref 32.2–35.5)
MCV RBC AUTO: 97.5 FL (ref 81.4–97.8)
MONOCYTES # BLD AUTO: 0.53 K/UL (ref 0–0.85)
MONOCYTES NFR BLD AUTO: 10.6 % (ref 0–13.4)
NEUTROPHILS # BLD AUTO: 2.58 K/UL (ref 1.82–7.42)
NEUTROPHILS NFR BLD: 51.5 % (ref 44–72)
NRBC # BLD AUTO: 0 K/UL
NRBC BLD-RTO: 0 /100 WBC (ref 0–0.2)
PLATELET # BLD AUTO: 322 K/UL (ref 164–446)
PMV BLD AUTO: 10.5 FL (ref 9–12.9)
POTASSIUM SERPL-SCNC: 4.3 MMOL/L (ref 3.6–5.5)
PROT SERPL-MCNC: 7.9 G/DL (ref 6–8.2)
RBC # BLD AUTO: 4.48 M/UL (ref 4.2–5.4)
SODIUM SERPL-SCNC: 139 MMOL/L (ref 135–145)
WBC # BLD AUTO: 5 K/UL (ref 4.8–10.8)

## 2024-10-16 ENCOUNTER — TELEPHONE (OUTPATIENT)
Dept: HEALTH INFORMATION MANAGEMENT | Facility: OTHER | Age: 69
End: 2024-10-16
Payer: MEDICARE

## 2025-06-11 ENCOUNTER — TELEPHONE (OUTPATIENT)
Dept: FAMILY PLANNING/WOMEN'S HEALTH CLINIC | Facility: PHYSICIAN GROUP | Age: 70
End: 2025-06-11
Payer: MEDICARE

## 2025-06-18 PROBLEM — Z71.85 VACCINE COUNSELING: Status: RESOLVED | Noted: 2023-01-25 | Resolved: 2025-06-18

## 2025-06-18 NOTE — ASSESSMENT & PLAN NOTE
Chronic, stable. Reviewed DEXA scan from February 2024. Continues to supplement with calcium and vitamin D3 daily. Encourage weight-bearing activity. Denies recent falls or fractures. Monitored by primary care provider.

## 2025-06-18 NOTE — ASSESSMENT & PLAN NOTE
Chronic, stable. Currently taking omeprazole 20 mg daily. Discussed avoiding dietary triggers. Denies early satiety, unintentional weight loss, belching, and persistent burning pain in chest or upper abdomen. Followed by primary care provider.

## 2025-06-18 NOTE — ASSESSMENT & PLAN NOTE
Chronic, ongoing. Reviewed lipid profile from January 2024. Currently taking atorvastatin 20 mg daily. Provided education on following a heart healthy diet with adequate intake of fruits, vegetables, and whole grains. Encourage 30 minutes of moderate exercise 3-4 times a week. Provided [employer] gym resources. Denies chest pain and claudication. Monitored by primary care provider.    Component  Ref Range & Units (hover) 1 yr ago   Cholesterol,Tot 215 High    Triglycerides 63       High

## 2025-06-18 NOTE — ASSESSMENT & PLAN NOTE
Chronic, stable. Currently taking paroxetine 10 mg daily. In-office PHQ-9 screening is negative. Reports that anxiety and mood is well-managed with current regimen. Denies SI/HI. Managed by primary care provider.

## 2025-06-19 ENCOUNTER — OFFICE VISIT (OUTPATIENT)
Dept: MEDICAL GROUP | Facility: MEDICAL CENTER | Age: 70
End: 2025-06-19
Payer: MEDICARE

## 2025-06-19 VITALS
WEIGHT: 159 LBS | TEMPERATURE: 99.5 F | HEIGHT: 63 IN | BODY MASS INDEX: 28.17 KG/M2 | HEART RATE: 85 BPM | SYSTOLIC BLOOD PRESSURE: 118 MMHG | OXYGEN SATURATION: 94 % | DIASTOLIC BLOOD PRESSURE: 76 MMHG

## 2025-06-19 DIAGNOSIS — F32.1 CURRENT MODERATE EPISODE OF MAJOR DEPRESSIVE DISORDER WITHOUT PRIOR EPISODE (HCC): ICD-10-CM

## 2025-06-19 DIAGNOSIS — Z53.20 COLON CANCER SCREENING DECLINED: ICD-10-CM

## 2025-06-19 DIAGNOSIS — Z12.31 SCREENING MAMMOGRAM, ENCOUNTER FOR: ICD-10-CM

## 2025-06-19 DIAGNOSIS — J30.2 SEASONAL ALLERGIES: ICD-10-CM

## 2025-06-19 DIAGNOSIS — K21.9 GASTROESOPHAGEAL REFLUX DISEASE WITHOUT ESOPHAGITIS: ICD-10-CM

## 2025-06-19 DIAGNOSIS — N18.31 CHRONIC KIDNEY DISEASE, STAGE 3A: ICD-10-CM

## 2025-06-19 DIAGNOSIS — E78.2 MIXED DYSLIPIDEMIA: ICD-10-CM

## 2025-06-19 DIAGNOSIS — M85.852 OSTEOPENIA OF NECK OF LEFT FEMUR: ICD-10-CM

## 2025-06-19 DIAGNOSIS — F41.1 GENERALIZED ANXIETY DISORDER: ICD-10-CM

## 2025-06-19 PROCEDURE — G0439 PPPS, SUBSEQ VISIT: HCPCS

## 2025-06-19 PROCEDURE — 1126F AMNT PAIN NOTED NONE PRSNT: CPT

## 2025-06-19 PROCEDURE — 3074F SYST BP LT 130 MM HG: CPT

## 2025-06-19 PROCEDURE — 3078F DIAST BP <80 MM HG: CPT

## 2025-06-19 SDOH — ECONOMIC STABILITY: FOOD INSECURITY: WITHIN THE PAST 12 MONTHS, THE FOOD YOU BOUGHT JUST DIDN'T LAST AND YOU DIDN'T HAVE MONEY TO GET MORE.: NEVER TRUE

## 2025-06-19 SDOH — ECONOMIC STABILITY: FOOD INSECURITY: HOW HARD IS IT FOR YOU TO PAY FOR THE VERY BASICS LIKE FOOD, HOUSING, MEDICAL CARE, AND HEATING?: NOT HARD AT ALL

## 2025-06-19 SDOH — ECONOMIC STABILITY: FOOD INSECURITY: WITHIN THE PAST 12 MONTHS, YOU WORRIED THAT YOUR FOOD WOULD RUN OUT BEFORE YOU GOT THE MONEY TO BUY MORE.: NEVER TRUE

## 2025-06-19 SDOH — ECONOMIC STABILITY: TRANSPORTATION INSECURITY: IN THE PAST 12 MONTHS, HAS LACK OF TRANSPORTATION KEPT YOU FROM MEDICAL APPOINTMENTS OR FROM GETTING MEDICATIONS?: NO

## 2025-06-19 ASSESSMENT — ACTIVITIES OF DAILY LIVING (ADL)
BATHING_REQUIRES_ASSISTANCE: 0
LACK_OF_TRANSPORTATION: NO

## 2025-06-19 ASSESSMENT — PAIN SCALES - GENERAL: PAINLEVEL_OUTOF10: NO PAIN

## 2025-06-19 ASSESSMENT — ENCOUNTER SYMPTOMS: GENERAL WELL-BEING: GOOD

## 2025-06-19 ASSESSMENT — PATIENT HEALTH QUESTIONNAIRE - PHQ9: CLINICAL INTERPRETATION OF PHQ2 SCORE: 0

## 2025-06-19 ASSESSMENT — FIBROSIS 4 INDEX: FIB4 SCORE: 1.32

## 2025-06-19 NOTE — ASSESSMENT & PLAN NOTE
Due for colonoscopy screening. Discussed Cologuard vs.colonoscopy screening-- declined at this time. Continue to follow with primary care provider.

## 2025-06-19 NOTE — ASSESSMENT & PLAN NOTE
Chronic, ongoing. Reports that symptoms are controlled with over-the-counter cetirizine and fluticasone nasal spray, but she continues to have chronic sinusitis. Previously followed by an allergist. Continue to follow with primary care provider for medication and symptom management.

## 2025-06-19 NOTE — PROGRESS NOTES
Comprehensive Health Assessment Program     Sanjuanita Flores is a 70 y.o. here for her comprehensive health assessment.    Patient Active Problem List    Diagnosis Date Noted    Screening mammogram, encounter for 06/19/2025    Colon cancer screening declined 06/19/2025    Stress incontinence 01/25/2023    Chronic kidney disease, stage 3a 11/03/2021    Osteopenia 11/03/2021    Current moderate episode of major depressive disorder (HCC) 04/29/2021    Seasonal allergies 06/25/2020    Gastroesophageal reflux disease without esophagitis 10/24/2019    ISADORA (generalized anxiety disorder) 10/24/2019    Mixed dyslipidemia 10/24/2019    Family history of diabetes mellitus (DM) 10/24/2019       Current Medications[1]       Current supplements as per medication list.     Allergies:   Tetracycline  Social History[2]  Family History   Problem Relation Age of Onset    Diabetes Mother     Heart Disease Father         die from MI    Diabetes Sister     Cancer Brother         Hodgkins    Cancer Brother         Melanoma    Cancer Maternal Grandmother         Breast     Sanjuanita  has a past medical history of GERD (gastroesophageal reflux disease), Hyperlipidemia, and Screening mammogram, encounter for (6/19/2025).   Past Surgical History[3]    Screening:  In the last six months have you experienced any leakage of urine? Yes, reports occasional leakage with use of absorbent pads.    Depression Screening  Little interest or pleasure in doing things?  0 - not at all  Feeling down, depressed , or hopeless? 0 - not at all  Patient Health Questionnaire Score:  0    If depressive symptoms identified deferred to follow up visit unless specifically addressed in assessment and plan.    Interpretation of PHQ-9 Total Score   Score Severity   1-4 No Depression   5-9 Mild Depression   10-14 Moderate Depression   15-19 Moderately Severe Depression   20-27 Severe Depression    Screening for Cognitive Impairment  Do you or any of your friends or  family members have any concern about your memory? No  Three Minute Recall (Village, Kitchen, Baby) 2/3    Cesar clock face with all 12 numbers and set the hands to show 10 minutes past 11.  Yes    Cognitive concerns identified deferred for follow up unless specifically addressed in assessment and plan.    Fall Risk Assessment  Has the patient had two or more falls in the last year or any fall with injury in the last year?  No    Safety Assessment  Do you always wear your seatbelt?  Yes  Any changes to home needed to function safely? No  Difficulty hearing.  Yes  Patient counseled about all safety risks that were identified.    Functional Assessment ADLs  Are there any barriers preventing you from cooking for yourself or meeting nutritional needs?  No.    Are there any barriers preventing you from driving safely or obtaining transportation?  No.    Are there any barriers preventing you from using a telephone or calling for help?  No    Are there any barriers preventing you from shopping?  No.    Are there any barriers preventing you from taking care of your own finances?  No    Are there any barriers preventing you from managing your medications?  No    Are there any barriers preventing you from showering, bathing or dressing yourself? No    Are there any barriers preventing you from doing housework or laundry? No    Are there any barriers preventing you from using the toilet?     Are you currently engaging in any exercise or physical activity?  Yes.      Self-Assessment of Health  What is your perception of your health? Good    Do you sleep more than six hours a night? Yes    In the past 7 days, how much did pain keep you from doing your normal work? None    Do you spend quality time with family or friends (virtually or in person)? Yes    Do you usually eat a heart healthy diet that constists of a variety of fruits, vegetables, whole grains and fiber? Yes    Do you eat foods high in fat and/or Fast Food more than  three times per week? No    How concerned are you that your medical conditions are not being well managed? Not at all    Are you worried that in the next 2 months, you may not have stable housing that you own, rent, or stay in as part of a household? No        Advance Care Planning  Do you have an Advance Directive, Living Will, Durable Power of , or POLST? Yes  Advance Directive Living Will     is on file      Health Maintenance Summary            Current Care Gaps       Colorectal Cancer Screening (Colonoscopy - Every 7 Years) Overdue since 2/24/2024 02/24/2017  REFERRAL TO GI FOR COLONOSCOPY              COVID-19 Vaccine (4 - 2024-25 season) Overdue since 9/1/2024 01/05/2022  Imm Admin: PFIZER PURPLE CAP SARS-COV-2 VACCINATION (12+)    03/23/2021  Imm Admin: PFIZER PURPLE CAP SARS-COV-2 VACCINATION (12+)    03/02/2021  Imm Admin: PFIZER PURPLE CAP SARS-COV-2 VACCINATION (12+)                      Needs Review       Bone Density Scan (Every 5 Years) Tentatively due on 2/21/2029 02/21/2024  DS-BONE DENSITY STUDY (DEXA)    12/27/2019  DS-BONE DENSITY STUDY (DEXA)                      Awaiting Completion       Mammogram (Yearly) Order placed this encounter      06/19/2025  Order placed for MA-SCREENING MAMMO BILAT W/TOMOSYNTHESIS W/CAD by ONEIL Blevins    02/21/2024  MA-SCREENING MAMMO BILAT W/TOMOSYNTHESIS W/CAD    02/02/2023  MA-SCREENING MAMMO BILAT W/TOMOSYNTHESIS W/CAD    12/22/2021  MA-SCREENING MAMMO BILAT W/TOMOSYNTHESIS W/CAD    12/27/2019  MA-SCREENING MAMMO BILAT W/TOMOSYNTHESIS W/CAD     Only the first 5 history entries have been loaded, but more history exists.                    Upcoming       Annual Wellness Visit (Yearly) Next due on 6/19/2026 06/19/2025  Level of Service: [place] ANNUAL WELLNESS VISIT-INCLUDES PPPS SUBSEQUE*    01/25/2023  Initial Annual Wellness Visit - Includes PPPS ()    01/25/2023  Visit Dx: Welcome to Medicare preventive visit     11/03/2021  Level of Service: [place] ANNUAL WELLNESS VISIT-INCLUDES PPPS SUBSEQUE*    04/29/2021  Level of Service: [place] PREVENTIVE VISIT,EST,65 & OVER      Only the first 5 history entries have been loaded, but more history exists.              IMM DTaP/Tdap/Td Vaccine (2 - Td or Tdap) Next due on 10/24/2029      10/24/2019  Imm Admin: Tdap Vaccine                      Completed or No Longer Recommended       Influenza Vaccine (Series Information) Completed      11/26/2024  Outside Immunization: Influenza, High Dose    11/16/2023  Outside Immunization: Influenza Quad Adjuvanted    01/11/2023  Imm Admin: Influenza Vaccine Adult HD    11/16/2021  Imm Admin: Influenza Vaccine Adult HD    12/01/2020  Imm Admin: Influenza Vaccine Quad Inj (Pf)      Only the first 5 history entries have been loaded, but more history exists.              Zoster (Shingles) Vaccines (Series Information) Completed      12/31/2020  Imm Admin: Zoster Vaccine Recombinant (RZV) (SHINGRIX)    07/31/2020  Imm Admin: Zoster Vaccine Recombinant (RZV) (SHINGRIX)              Pneumococcal Vaccine: 50+ Years (Series Information) Completed      01/25/2023  Imm Admin: Pneumococcal Conjugate Vaccine (PCV20)    04/29/2021  Imm Admin: Pneumococcal polysaccharide vaccine (PPSV-23)              Hepatitis A Vaccine (Hep A) (Series Information) Aged Out      No completion history exists for this topic.              HPV Vaccines (Series Information) Aged Out     No completion history exists for this topic.              Polio Vaccine (Inactivated Polio) (Series Information) Aged Out     No completion history exists for this topic.              Meningococcal Immunization (Series Information) Aged Out     No completion history exists for this topic.              Meningococcal B Vaccine (Series Information) Aged Out     No completion history exists for this topic.              Cervical Cancer Screening  Discontinued        Frequency changed to Never automatically (Topic  "No Longer Applies)    04/14/2021  PAP Only              Hepatitis B Vaccine (Hep B)  Discontinued     No completion history exists for this topic.              Hepatitis C Screening  Discontinued     No completion history exists for this topic.                            Patient Care Team:  Vaishnavi Parks D.O. as PCP - General (Family Medicine)    Financial Resource Strain: Low Risk  (6/19/2025)    Overall Financial Resource Strain (CARDIA)     Difficulty of Paying Living Expenses: Not hard at all      Transportation Needs: No Transportation Needs (6/19/2025)    PRAPARE - Transportation     Lack of Transportation (Medical): No     Lack of Transportation (Non-Medical): No      Food Insecurity: No Food Insecurity (6/19/2025)    Hunger Vital Sign     Worried About Running Out of Food in the Last Year: Never true     Ran Out of Food in the Last Year: Never true        Encounter Vitals  Temperature: 37.5 °C (99.5 °F)  Blood Pressure : 118/76  Pulse: 85  Pulse Oximetry: 94 %  Weight: 72.1 kg (159 lb)  Height: 158.8 cm (5' 2.5\")  BMI (Calculated): 28.62  Pain Score: No pain     ROS:  No fever, chills, nausea, vomiting, diarrhea, chest pain or shortness of breath. See HPI.    Physical Exam  Vitals reviewed.   Constitutional:       Appearance: Normal appearance.   Cardiovascular:      Rate and Rhythm: Normal rate and regular rhythm.      Pulses: Normal pulses.      Heart sounds: Normal heart sounds.   Pulmonary:      Effort: Pulmonary effort is normal.      Breath sounds: Normal breath sounds.   Skin:     General: Skin is warm and dry.      Capillary Refill: Capillary refill takes less than 2 seconds.   Neurological:      General: No focal deficit present.      Mental Status: She is alert and oriented to person, place, and time.   Psychiatric:         Mood and Affect: Mood normal.       Assessment and Plan. The following treatment and monitoring plan is recommended:    Chronic kidney disease, stage 3a (HCC)  Chronic, " stable. Provided education on avoiding high-dose NSAIDs. Encourage a low-protein diet. Limit sodium intake to less than 2 grams/day. Encourage at least 64 ounces of water intake daily. Monitored by primary care provider.    Current moderate episode of major depressive disorder (HCC)  ISADORA (generalized anxiety disorder)  Chronic, stable. Currently taking paroxetine 10 mg daily. In-office PHQ-9 screening is negative. Reports that anxiety and mood is well-managed with current regimen. Denies SI/HI. Managed by primary care provider.    Gastroesophageal reflux disease without esophagitis  Chronic, stable. Currently taking omeprazole 20 mg daily. Discussed avoiding dietary triggers. Denies early satiety, unintentional weight loss, belching, and persistent burning pain in chest or upper abdomen. Followed by primary care provider.     Mixed dyslipidemia  Chronic, ongoing. Reviewed lipid profile from January 2024. Currently taking atorvastatin 20 mg daily. Provided education on following a heart healthy diet with adequate intake of fruits, vegetables, and whole grains. Encourage 30 minutes of moderate exercise 3-4 times a week. Provided [employer] [facility] [employer] gym resources. Denies chest pain and claudication. Monitored by primary care provider.    Component  Ref Range & Units (hover) 1 yr ago   Cholesterol,Tot 215 High    Triglycerides 63       High         Osteopenia  Chronic, stable. Reviewed DEXA scan from February 2024. Continues to supplement with calcium and vitamin D3 daily. Encourage weight-bearing activity. Denies recent falls or fractures. Followed by primary care provider.    Seasonal allergies  Chronic, ongoing. Reports that symptoms are controlled with over-the-counter cetirizine and fluticasone nasal spray, but she continues to have chronic sinusitis. Previously followed by an allergist. Continue to follow with primary care provider for medication and symptom management.      Colon cancer screening  declined  Due for colonoscopy screening. Discussed Cologuard vs.colonoscopy screening options-- declined at this time. Continue to follow with primary care provider.    Screening mammogram, encounter for  Mammogram ordered.        Services suggested: No services needed at this time  Health Care Screening: Age-appropriate preventive services recommended by USPTF and ACIP covered by Medicare were discussed today. Services ordered if indicated and agreed upon by the patient.  Referrals offered: Community-based lifestyle interventions to reduce health risks and promote self-management and wellness, fall prevention, nutrition, physical activity, tobacco-use cessation, weight loss, and mental health services as per orders if indicated.    Discussion today about general wellness and lifestyle habits:    Prevent falls and reduce trip hazards; Cautioned about securing or removing rugs.  Have a working fire alarm and carbon monoxide detector.  Engage in regular physical activity and social activities.    Follow-up: Return for appointment with Primary Care Provider as needed.              [1]   Current Outpatient Medications   Medication Sig Dispense Refill    fluticasone (FLONASE) 50 MCG/ACT nasal spray Administer 1 Spray into affected nostril(S) every day.      atorvastatin (LIPITOR) 20 MG Tab Take 1 Tablet by mouth at bedtime. 90 Tablet 3    PARoxetine (PAXIL) 10 MG Tab TAKE 1 TABLET BY MOUTH EVERY DAY 90 Tablet 3    omeprazole (PRILOSEC) 20 MG delayed-release capsule Take 20 mg by mouth every day.      Levocetirizine Dihydrochloride 5 MG Tab Take 1 tablet by mouth at bedtime.      Calcium Carbonate-Vitamin D (CALCIUM + D PO) Take  by mouth every day.        Nirmatrelvir&Ritonavir 150/100 10 x 150 MG & 10 x 100MG Tablet Therapy Pack Take 150 mg nirmatrelvir (one 150 mg tablet) with 100 mg ritonavir (one 100 mg tablet) by mouth, with both tablets taken together twice daily for 5 days. 20 Each 0    famotidine (PEPCID) 20 MG  Tab Take 1 Tab by mouth 2 times a day. (Patient not taking: Reported on 2025) 180 Tab 3    Multiple Vitamins-Minerals (MULTIVITAMIN PO) Take  by mouth every day.   (Patient not taking: Reported on 2025)       No current facility-administered medications for this visit.   [2]   Social History  Tobacco Use    Smoking status: Former     Current packs/day: 0.00     Average packs/day: 0.2 packs/day for 10.0 years (2.0 ttl pk-yrs)     Types: Cigarettes     Start date: 1987     Quit date: 1997     Years since quittin.4    Smokeless tobacco: Never   Vaping Use    Vaping status: Never Used   Substance Use Topics    Alcohol use: Yes     Comment: occassional    Drug use: No   [3]   Past Surgical History:  Procedure Laterality Date    SHOULDER ARTHROSCOPY W/ ROTATOR CUFF REPAIR  2013    Performed by Dakota Sawyer M.D. at SURGERY [facility]    SHOULDER DECOMPRESSION ARTHROSCOPIC  2013    Performed by Dakota Sawyer M.D. at SURGERY [facility]    SHOULDER ARTHROSCOPY W/ BICIPITAL TENODESIS REPAIR  2013    Performed by Dakota Sawyer M.D. at SURGERY [facility]    ORIF, ANKLE Bilateral     ankle pinning

## 2025-06-19 NOTE — ASSESSMENT & PLAN NOTE
Chronic, stable. Provided education on avoiding high-dose NSAIDs. Encourage a low-protein diet. Limit sodium intake to less than 2 grams/day. Encourage at least 64 ounces of water intake daily. Monitored by primary care provider.

## 2025-07-18 ENCOUNTER — HOSPITAL ENCOUNTER (OUTPATIENT)
Facility: MEDICAL CENTER | Age: 70
End: 2025-07-18
Attending: FAMILY MEDICINE
Payer: MEDICARE

## 2025-07-18 LAB
ALBUMIN SERPL BCP-MCNC: 4.9 G/DL (ref 3.2–4.9)
ALBUMIN/GLOB SERPL: 1.7 G/DL
ALP SERPL-CCNC: 90 U/L (ref 30–99)
ALT SERPL-CCNC: 25 U/L (ref 2–50)
ANION GAP SERPL CALC-SCNC: 14 MMOL/L (ref 7–16)
AST SERPL-CCNC: 29 U/L (ref 12–45)
BILIRUB SERPL-MCNC: 0.8 MG/DL (ref 0.1–1.5)
BUN SERPL-MCNC: 17 MG/DL (ref 8–22)
CALCIUM ALBUM COR SERPL-MCNC: 9.2 MG/DL (ref 8.5–10.5)
CALCIUM SERPL-MCNC: 9.9 MG/DL (ref 8.5–10.5)
CHLORIDE SERPL-SCNC: 100 MMOL/L (ref 96–112)
CO2 SERPL-SCNC: 24 MMOL/L (ref 20–33)
CREAT SERPL-MCNC: 1.07 MG/DL (ref 0.5–1.4)
ERYTHROCYTE [DISTWIDTH] IN BLOOD BY AUTOMATED COUNT: 52.7 FL (ref 35.9–50)
GFR SERPLBLD CREATININE-BSD FMLA CKD-EPI: 56 ML/MIN/1.73 M 2
GLOBULIN SER CALC-MCNC: 2.9 G/DL (ref 1.9–3.5)
GLUCOSE SERPL-MCNC: 90 MG/DL (ref 65–99)
HCT VFR BLD AUTO: 45.7 % (ref 37–47)
HGB BLD-MCNC: 15.2 G/DL (ref 12–16)
MCH RBC QN AUTO: 32.7 PG (ref 27–33)
MCHC RBC AUTO-ENTMCNC: 33.3 G/DL (ref 32.2–35.5)
MCV RBC AUTO: 98.3 FL (ref 81.4–97.8)
PLATELET # BLD AUTO: 361 K/UL (ref 164–446)
PMV BLD AUTO: 10.1 FL (ref 9–12.9)
POTASSIUM SERPL-SCNC: 4.6 MMOL/L (ref 3.6–5.5)
PROT SERPL-MCNC: 7.8 G/DL (ref 6–8.2)
RBC # BLD AUTO: 4.65 M/UL (ref 4.2–5.4)
SODIUM SERPL-SCNC: 138 MMOL/L (ref 135–145)
WBC # BLD AUTO: 4.7 K/UL (ref 4.8–10.8)

## 2025-07-18 PROCEDURE — 80053 COMPREHEN METABOLIC PANEL: CPT

## 2025-07-18 PROCEDURE — 85027 COMPLETE CBC AUTOMATED: CPT

## 2025-08-12 ENCOUNTER — OFFICE VISIT (OUTPATIENT)
Dept: DERMATOLOGY | Facility: IMAGING CENTER | Age: 70
End: 2025-08-12
Payer: MEDICARE

## 2025-08-12 DIAGNOSIS — Z12.83 SKIN CANCER SCREENING: ICD-10-CM

## 2025-08-12 DIAGNOSIS — D18.01 CHERRY ANGIOMA: ICD-10-CM

## 2025-08-12 DIAGNOSIS — D22.9 MULTIPLE NEVI: ICD-10-CM

## 2025-08-12 DIAGNOSIS — L72.0 MILIA: ICD-10-CM

## 2025-08-12 DIAGNOSIS — L82.1 SK (SEBORRHEIC KERATOSIS): ICD-10-CM

## 2025-08-12 DIAGNOSIS — L81.4 LENTIGINES: Primary | ICD-10-CM

## 2025-08-12 PROCEDURE — 99212 OFFICE O/P EST SF 10 MIN: CPT | Performed by: NURSE PRACTITIONER

## 2025-08-13 ENCOUNTER — APPOINTMENT (OUTPATIENT)
Dept: DERMATOLOGY | Facility: IMAGING CENTER | Age: 70
End: 2025-08-13